# Patient Record
Sex: FEMALE | Race: WHITE | Employment: UNEMPLOYED | URBAN - METROPOLITAN AREA
[De-identification: names, ages, dates, MRNs, and addresses within clinical notes are randomized per-mention and may not be internally consistent; named-entity substitution may affect disease eponyms.]

---

## 2019-08-28 ENCOUNTER — HOSPITAL ENCOUNTER (EMERGENCY)
Facility: CLINIC | Age: 19
Discharge: HOME | End: 2019-08-28
Attending: EMERGENCY MEDICINE | Admitting: EMERGENCY MEDICINE
Payer: MEDICAID

## 2019-08-28 ENCOUNTER — APPOINTMENT (OUTPATIENT)
Dept: ULTRASOUND IMAGING | Facility: CLINIC | Age: 19
End: 2019-08-28
Payer: MEDICAID

## 2019-08-28 VITALS
HEART RATE: 75 BPM | TEMPERATURE: 98.6 F | RESPIRATION RATE: 16 BRPM | WEIGHT: 135 LBS | DIASTOLIC BLOOD PRESSURE: 65 MMHG | OXYGEN SATURATION: 100 % | SYSTOLIC BLOOD PRESSURE: 129 MMHG

## 2019-08-28 DIAGNOSIS — R58 BLEEDING: ICD-10-CM

## 2019-08-28 DIAGNOSIS — O02.1 MISSED ABORTION: ICD-10-CM

## 2019-08-28 DIAGNOSIS — R10.2 PELVIC CRAMPING: ICD-10-CM

## 2019-08-28 LAB
ABO + RH BLD: NORMAL
ABO + RH BLD: NORMAL
B-HCG SERPL-ACNC: 3810 IU/L (ref 0–5)
BLD GP AB SCN SERPL QL: NORMAL
BLOOD BANK CMNT PATIENT-IMP: NORMAL
SPECIMEN EXP DATE BLD: NORMAL

## 2019-08-28 PROCEDURE — 25000132 ZZH RX MED GY IP 250 OP 250 PS 637: Performed by: EMERGENCY MEDICINE

## 2019-08-28 PROCEDURE — 86900 BLOOD TYPING SEROLOGIC ABO: CPT | Performed by: STUDENT IN AN ORGANIZED HEALTH CARE EDUCATION/TRAINING PROGRAM

## 2019-08-28 PROCEDURE — 86850 RBC ANTIBODY SCREEN: CPT | Performed by: STUDENT IN AN ORGANIZED HEALTH CARE EDUCATION/TRAINING PROGRAM

## 2019-08-28 PROCEDURE — 99284 EMERGENCY DEPT VISIT MOD MDM: CPT | Mod: 25

## 2019-08-28 PROCEDURE — 76801 OB US < 14 WKS SINGLE FETUS: CPT

## 2019-08-28 PROCEDURE — 36415 COLL VENOUS BLD VENIPUNCTURE: CPT | Performed by: STUDENT IN AN ORGANIZED HEALTH CARE EDUCATION/TRAINING PROGRAM

## 2019-08-28 PROCEDURE — 84702 CHORIONIC GONADOTROPIN TEST: CPT | Performed by: STUDENT IN AN ORGANIZED HEALTH CARE EDUCATION/TRAINING PROGRAM

## 2019-08-28 PROCEDURE — 86901 BLOOD TYPING SEROLOGIC RH(D): CPT | Performed by: STUDENT IN AN ORGANIZED HEALTH CARE EDUCATION/TRAINING PROGRAM

## 2019-08-28 RX ORDER — OXYCODONE HYDROCHLORIDE 5 MG/1
5 TABLET ORAL ONCE
Status: COMPLETED | OUTPATIENT
Start: 2019-08-28 | End: 2019-08-28

## 2019-08-28 RX ORDER — ACETAMINOPHEN 325 MG/1
650 TABLET ORAL ONCE
Status: COMPLETED | OUTPATIENT
Start: 2019-08-28 | End: 2019-08-28

## 2019-08-28 RX ORDER — PRENATAL VIT/IRON FUM/FOLIC AC 27MG-0.8MG
1 TABLET ORAL DAILY
COMMUNITY
End: 2020-12-08

## 2019-08-28 RX ADMIN — ACETAMINOPHEN 650 MG: 325 TABLET, FILM COATED ORAL at 15:33

## 2019-08-28 RX ADMIN — OXYCODONE HYDROCHLORIDE 5 MG: 5 TABLET ORAL at 16:14

## 2019-08-28 SDOH — HEALTH STABILITY: MENTAL HEALTH: HOW OFTEN DO YOU HAVE A DRINK CONTAINING ALCOHOL?: NEVER

## 2019-08-28 ASSESSMENT — ENCOUNTER SYMPTOMS
VOMITING: 0
ABDOMINAL PAIN: 1
CONSTIPATION: 0
FEVER: 0
DIARRHEA: 0
BLOOD IN STOOL: 0
NAUSEA: 1

## 2019-08-28 NOTE — DISCHARGE INSTRUCTIONS
Please make an appointment to follow up with [facility] [phone] in the next few days even if entirely better.

## 2019-08-28 NOTE — ED TRIAGE NOTES
Pt presents to ED due possible miscarriage. Pt states she just had an US at [facility] and was told she likely miscarried and to get a 2nd opinion. Per pt she is having some abdominal cramping, but this has been normal for her during this pregnancy. Reports some intermittent bleeding for 5-6 days.

## 2019-08-28 NOTE — ED AVS SNAPSHOT
[facility]  [address]  [facility] [address]  Phone:  [phone]  Fax:  [phone]                                    Carolyn Cerda   MRN: [id number]    Department:  [facility]   Date of Visit:  8/28/2019           After Visit Summary Signature Page    I have received my discharge instructions, and my questions have been answered. I have discussed any challenges I see with this plan with the nurse or doctor.    ..........................................................................................................................................  Patient/Patient Representative Signature      ..........................................................................................................................................  Patient Representative Print Name and Relationship to Patient    ..................................................               ................................................  Date                                   Time    ..........................................................................................................................................  Reviewed by Signature/Title    ...................................................              ..............................................  Date                                               Time          22EPIC Rev 08/18

## 2019-08-29 NOTE — ED PROVIDER NOTES
Emergency Department Attending Supervision Note  2019  7:36 AM      I evaluated this patient in conjunction with Zoe Bhatia MD      Briefly, the patient presented with concerns of possible miscarriage.  Patient is  with LMP 2019.  Patient had a positive home pregnancy test which was confirmed by [facility] in July.  On  she had a pelvic ultrasound at [facility] revealing a intrauterine pregnancy with fetal cardiac activity in the 80s.  There was no signs of ectopic or heterotopic pregnancy.  I discussed the results the results with the nurse on-call for their facility and confirmed that the ultrasound was read by a physician.  Patient was doing well but then began to have problems with lower abdominal cramping and spotting.  She went to [address] [place] [address] 6 days prior which is apparently a primarily ultrasound service.  She was told that there was a gestational sac measuring 6 weeks but no signs of cardiac activity.  She then returned for repeat ultrasound and told that there was no signs of pregnancy within the uterus.  She presents today for further clarification of her underlying process.      On my exam, patient is resting comfortably in the bed.  She has minimal diffuse lower abdominal tenderness.  Uterine fundus nonpalpable.  Pelvic examination with Dr. Gar revealed a mild amount of dark blood within the vaginal vault.  Cervix was closed.    hCG remains elevated at 3800.  She is not a RhoGam candidate.  Pelvic ultrasound reveals gestational sac with no other signs of intrauterine pregnancy or ectopic pregnancy.  Clinical evaluation is most consistent with intrauterine fetal demise and missed .  Patient is safe for discharge home.  She will use Tylenol as needed for pain.  She was referred to OB/GYN services for further evaluation.  She was made aware that if she fails to complete the miscarriage on her own, that this may require D&C.  Patient comfortable with plan and  questions answered.        Diagnosis    (O02.1) Missed         MD Felix Mariscal Jeremiah R, MD  19 0766

## 2019-08-30 ENCOUNTER — HOSPITAL ENCOUNTER (EMERGENCY)
Facility: CLINIC | Age: 19
Discharge: HOME | End: 2019-08-31
Attending: EMERGENCY MEDICINE | Admitting: EMERGENCY MEDICINE
Payer: MEDICAID

## 2019-08-30 ENCOUNTER — APPOINTMENT (OUTPATIENT)
Dept: ULTRASOUND IMAGING | Facility: CLINIC | Age: 19
End: 2019-08-30
Attending: EMERGENCY MEDICINE
Payer: MEDICAID

## 2019-08-30 ENCOUNTER — NURSE TRIAGE (OUTPATIENT)
Dept: NURSING | Facility: CLINIC | Age: 19
End: 2019-08-30

## 2019-08-30 DIAGNOSIS — O03.9 MISCARRIAGE: ICD-10-CM

## 2019-08-30 LAB
ABO + RH BLD: NORMAL
ABO + RH BLD: NORMAL
ALBUMIN SERPL-MCNC: 3.7 G/DL (ref 3.4–5)
ALP SERPL-CCNC: 70 U/L (ref 40–150)
ALT SERPL W P-5'-P-CCNC: 32 U/L (ref 0–50)
ANION GAP SERPL CALCULATED.3IONS-SCNC: 9 MMOL/L (ref 3–14)
AST SERPL W P-5'-P-CCNC: 20 U/L (ref 0–45)
BASOPHILS # BLD AUTO: 0 10E9/L (ref 0–0.2)
BASOPHILS NFR BLD AUTO: 0.3 %
BILIRUB SERPL-MCNC: 0.5 MG/DL (ref 0.2–1.3)
BLD GP AB SCN SERPL QL: NORMAL
BLOOD BANK CMNT PATIENT-IMP: NORMAL
BUN SERPL-MCNC: 10 MG/DL (ref 7–30)
CALCIUM SERPL-MCNC: 8.6 MG/DL (ref 8.5–10.1)
CHLORIDE SERPL-SCNC: 108 MMOL/L (ref 94–109)
CO2 SERPL-SCNC: 22 MMOL/L (ref 20–32)
CREAT SERPL-MCNC: 0.57 MG/DL (ref 0.52–1.04)
DIFFERENTIAL METHOD BLD: NORMAL
EOSINOPHIL # BLD AUTO: 0.2 10E9/L (ref 0–0.7)
EOSINOPHIL NFR BLD AUTO: 1.9 %
ERYTHROCYTE [DISTWIDTH] IN BLOOD BY AUTOMATED COUNT: 13.9 % (ref 10–15)
GFR SERPL CREATININE-BSD FRML MDRD: >90 ML/MIN/{1.73_M2}
GLUCOSE SERPL-MCNC: 90 MG/DL (ref 70–99)
HCT VFR BLD AUTO: 37.2 % (ref 35–47)
HGB BLD-MCNC: 12.2 G/DL (ref 11.7–15.7)
IMM GRANULOCYTES # BLD: 0.1 10E9/L (ref 0–0.4)
IMM GRANULOCYTES NFR BLD: 0.5 %
LYMPHOCYTES # BLD AUTO: 2 10E9/L (ref 0.8–5.3)
LYMPHOCYTES NFR BLD AUTO: 18.6 %
MCH RBC QN AUTO: 28.2 PG (ref 26.5–33)
MCHC RBC AUTO-ENTMCNC: 32.8 G/DL (ref 31.5–36.5)
MCV RBC AUTO: 86 FL (ref 78–100)
MONOCYTES # BLD AUTO: 0.9 10E9/L (ref 0–1.3)
MONOCYTES NFR BLD AUTO: 8.5 %
NEUTROPHILS # BLD AUTO: 7.4 10E9/L (ref 1.6–8.3)
NEUTROPHILS NFR BLD AUTO: 70.2 %
NRBC # BLD AUTO: 0 10*3/UL
NRBC BLD AUTO-RTO: 0 /100
PLATELET # BLD AUTO: 181 10E9/L (ref 150–450)
POTASSIUM SERPL-SCNC: 3.3 MMOL/L (ref 3.4–5.3)
PROT SERPL-MCNC: 7.5 G/DL (ref 6.8–8.8)
RBC # BLD AUTO: 4.32 10E12/L (ref 3.8–5.2)
SODIUM SERPL-SCNC: 139 MMOL/L (ref 133–144)
SPECIMEN EXP DATE BLD: NORMAL
WBC # BLD AUTO: 10.5 10E9/L (ref 4–11)

## 2019-08-30 PROCEDURE — 86850 RBC ANTIBODY SCREEN: CPT | Performed by: EMERGENCY MEDICINE

## 2019-08-30 PROCEDURE — 25000132 ZZH RX MED GY IP 250 OP 250 PS 637: Performed by: EMERGENCY MEDICINE

## 2019-08-30 PROCEDURE — 96375 TX/PRO/DX INJ NEW DRUG ADDON: CPT | Performed by: EMERGENCY MEDICINE

## 2019-08-30 PROCEDURE — 80053 COMPREHEN METABOLIC PANEL: CPT | Performed by: EMERGENCY MEDICINE

## 2019-08-30 PROCEDURE — 25000128 H RX IP 250 OP 636: Performed by: EMERGENCY MEDICINE

## 2019-08-30 PROCEDURE — 99285 EMERGENCY DEPT VISIT HI MDM: CPT | Mod: 25 | Performed by: EMERGENCY MEDICINE

## 2019-08-30 PROCEDURE — 85025 COMPLETE CBC W/AUTO DIFF WBC: CPT | Performed by: EMERGENCY MEDICINE

## 2019-08-30 PROCEDURE — 76801 OB US < 14 WKS SINGLE FETUS: CPT

## 2019-08-30 PROCEDURE — 86900 BLOOD TYPING SEROLOGIC ABO: CPT | Performed by: EMERGENCY MEDICINE

## 2019-08-30 PROCEDURE — 96374 THER/PROPH/DIAG INJ IV PUSH: CPT | Performed by: EMERGENCY MEDICINE

## 2019-08-30 PROCEDURE — 99284 EMERGENCY DEPT VISIT MOD MDM: CPT | Mod: Z6 | Performed by: EMERGENCY MEDICINE

## 2019-08-30 PROCEDURE — 86901 BLOOD TYPING SEROLOGIC RH(D): CPT | Performed by: EMERGENCY MEDICINE

## 2019-08-30 RX ORDER — MORPHINE SULFATE 4 MG/ML
4 INJECTION, SOLUTION INTRAMUSCULAR; INTRAVENOUS
Status: COMPLETED | OUTPATIENT
Start: 2019-08-30 | End: 2019-08-30

## 2019-08-30 RX ORDER — HYDROCODONE BITARTRATE AND ACETAMINOPHEN 5; 325 MG/1; MG/1
1 TABLET ORAL ONCE
Status: COMPLETED | OUTPATIENT
Start: 2019-08-30 | End: 2019-08-30

## 2019-08-30 RX ORDER — HYDROCODONE BITARTRATE AND ACETAMINOPHEN 5; 325 MG/1; MG/1
1 TABLET ORAL EVERY 6 HOURS PRN
Qty: 6 TABLET | Refills: 0 | Status: SHIPPED | OUTPATIENT
Start: 2019-08-30 | End: 2019-11-27

## 2019-08-30 RX ORDER — HYDROMORPHONE HYDROCHLORIDE 1 MG/ML
0.5 INJECTION, SOLUTION INTRAMUSCULAR; INTRAVENOUS; SUBCUTANEOUS
Status: COMPLETED | OUTPATIENT
Start: 2019-08-30 | End: 2019-08-30

## 2019-08-30 RX ADMIN — HYDROMORPHONE HYDROCHLORIDE 0.5 MG: 1 INJECTION, SOLUTION INTRAMUSCULAR; INTRAVENOUS; SUBCUTANEOUS at 22:16

## 2019-08-30 RX ADMIN — MORPHINE SULFATE 4 MG: 4 INJECTION INTRAVENOUS at 20:02

## 2019-08-30 RX ADMIN — HYDROCODONE BITARTRATE AND ACETAMINOPHEN 1 TABLET: 5; 325 TABLET ORAL at 19:38

## 2019-08-30 ASSESSMENT — ENCOUNTER SYMPTOMS
DIFFICULTY URINATING: 0
ARTHRALGIAS: 0
ABDOMINAL PAIN: 0
FEVER: 0
CONFUSION: 0
SHORTNESS OF BREATH: 0
HEADACHES: 0
EYE REDNESS: 0
NECK STIFFNESS: 0
COLOR CHANGE: 0

## 2019-08-30 ASSESSMENT — MIFFLIN-ST. JEOR: SCORE: 1346.49

## 2019-08-30 NOTE — ED AVS SNAPSHOT
[facility], [place], Emergency Department  [address] [place] [address]  Phone:  [phone]                                    Carolyn Cerda   MRN: [id number]    Department:  [facility], Emergency Department   Date of Visit:  8/30/2019           After Visit Summary Signature Page    I have received my discharge instructions, and my questions have been answered. I have discussed any challenges I see with this plan with the nurse or doctor.    ..........................................................................................................................................  Patient/Patient Representative Signature      ..........................................................................................................................................  Patient Representative Print Name and Relationship to Patient    ..................................................               ................................................  Date                                   Time    ..........................................................................................................................................  Reviewed by Signature/Title    ...................................................              ..............................................  Date                                               Time          22EPIC Rev 08/18

## 2019-08-30 NOTE — LETTER
August 30, 2019      To Whom It May Concern:      Carolyn Cerda was seen in our Emergency Department today, 08/30/19.  I expect her condition to improve over the next 5 days.  She may return to work when improved.    Sincerely,        Jomar Broussard, DO

## 2019-08-30 NOTE — TELEPHONE ENCOUNTER
Patient reports she was in the emergency department on 8/28 for miscarriage. She was told to follow up with OB provider. Patient reports constant abdominal pain for last 3 hours. Rates pain 9-10/10. She is soaking about 1 pad per hour since this morning. Feels dizzy, but doesn't feel like she is going to faint.   Triage guidelines recommend ED. Protocol and care advice reviewed.  Caller states understanding of the recommended disposition. FNA gave address of [facility] ED.   Advised to call back if further questions or concerns.    Madelin York RN/[facility]    Reason for Disposition    SEVERE abdominal pain    Additional Information    Negative: Shock suspected (e.g., cold/pale/clammy skin, too weak to stand, low BP, rapid pulse)    Negative: Difficult to awaken or acting confused (e.g., disoriented, slurred speech)    Negative: Passed out (i.e., lost consciousness, collapsed and was not responding)    Negative: Sounds like a life-threatening emergency to the triager    Protocols used: VAGINAL BLEEDING - [id number]

## 2019-08-30 NOTE — ED TRIAGE NOTES
"Triage Assessment & Note:    /50   Pulse 101   Temp 98.3  F (36.8  C) (Oral)   Resp 18   Ht 1.6 m (5' 3\")   Wt 61.2 kg (135 lb)   LMP 05/20/2019   SpO2 100%   BMI 23.91 kg/m      Patient presents with: C/o abdominal pain post miscarriage last several days. Today she presents with increasing abdominal pain and vaginal discharge. PT reports going though 4 pads today.     Home Treatments/Remedies: Midial    Febrile / Afebrile? Afebrile     Duration of C/o:  1 week     Abdi Muller RN  August 30, 2019      "

## 2019-08-31 VITALS
SYSTOLIC BLOOD PRESSURE: 108 MMHG | TEMPERATURE: 98.3 F | WEIGHT: 135 LBS | HEIGHT: 63 IN | DIASTOLIC BLOOD PRESSURE: 60 MMHG | RESPIRATION RATE: 18 BRPM | HEART RATE: 74 BPM | OXYGEN SATURATION: 100 % | BODY MASS INDEX: 23.92 KG/M2

## 2019-08-31 NOTE — DISCHARGE INSTRUCTIONS
Please make an appointment to follow up with OB/Gyn--[facility] (phone: [phone]) as soon as possible.    Return to the emergency department if symptoms get worse, there are any new symptoms or any cause for concern.

## 2019-08-31 NOTE — ED PROVIDER NOTES
History     Chief Complaint   Patient presents with     Miscarriage     HPI  Carolyn Cerda is a 19 year old female who is  who presents to the Emergency Department today for evaluation of vaginal bleeding and pelvic pain with concern of miscarriage.  Per chart review, on  the patient had a pelvic ultrasound performed which estimated a pregnancy to be about 6 weeks with cardiac activity present.  8 days ago, the patient was then seen due to abdominal cramping and spotting.  At that time the [facility] in [place] where she had an abdominal ultrasound performed and no heartbeat was heard, but baby was seen on ultrasound.  The patient was then seen in the ED on  after being seen in clinic and was told that she was having a miscarriage.    Today, the patient reports that she has continued to have pelvic pain and spotting since her ED visit.  However, today the patient reports that her pelvic pain and vaginal bleeding became significantly worse.  She reports that she has been having sharp pelvic pain along with heavy vaginal bleeding.  She reports that her pain and bleeding increase this morning.  She reports that she has gone through at least 4 pads today.  The patient reports that her pelvic pain was most significant in her left pelvis but has spread across her entire pelvis.  The patient denies any history of pregnancy.  No other complaints noted.    I have reviewed the Medications, Allergies, Past Medical and Surgical History, and Social History in the [employer] system.    Past Medical History:   Diagnosis Date     Anemia      No past surgical history on file.    No family history on file.    Social History     Tobacco Use     Smoking status: Never Smoker     Smokeless tobacco: Never Used   Substance Use Topics     Alcohol use: Never     Frequency: Never     No current facility-administered medications for this encounter.      Current Outpatient Medications   Medication      "Prenatal Vit-Fe Fumarate-FA (PRENATAL MULTIVITAMIN W/IRON) 27-0.8 MG tablet      No Known Allergies     Review of Systems   Constitutional: Negative for fever.   HENT: Negative for congestion.    Eyes: Negative for redness.   Respiratory: Negative for shortness of breath.    Cardiovascular: Negative for chest pain.   Gastrointestinal: Negative for abdominal pain.   Genitourinary: Positive for pelvic pain and vaginal bleeding. Negative for difficulty urinating.   Musculoskeletal: Negative for arthralgias and neck stiffness.   Skin: Negative for color change.   Neurological: Negative for headaches.   Psychiatric/Behavioral: Negative for confusion.       Physical Exam   BP: 127/50  Pulse: 101  Temp: 98.3  F (36.8  C)  Resp: 18  Height: 160 cm (5' 3\")  Weight: 61.2 kg (135 lb)  SpO2: 100 %    Physical Exam   Constitutional: She is oriented to person, place, and time. She appears well-developed and well-nourished. She appears distressed.   HENT:   Head: Normocephalic and atraumatic.   Mouth/Throat: No oropharyngeal exudate.   Eyes: Pupils are equal, round, and reactive to light. Right eye exhibits no discharge. Left eye exhibits no discharge. No scleral icterus.   Neck: Normal range of motion. Neck supple.   Cardiovascular: Regular rhythm, normal heart sounds and intact distal pulses. Tachycardia present. Exam reveals no gallop and no friction rub.   No murmur heard.  Pulmonary/Chest: Effort normal and breath sounds normal. No respiratory distress. She has no wheezes. She exhibits no tenderness.   Abdominal: Soft. Bowel sounds are normal. She exhibits no distension. There is tenderness in the right lower quadrant, suprapubic area and left lower quadrant.   Genitourinary:   Genitourinary Comments: Minimal vaginal bleeding.    Musculoskeletal: Normal range of motion. She exhibits no edema, tenderness or deformity.   Neurological: She is alert and oriented to person, place, and time. No cranial nerve deficit.   Skin: Skin " is warm and dry. No rash noted. She is not diaphoretic. No erythema. No pallor.   Psychiatric: She has a normal mood and affect.   Nursing note and vitals reviewed.      ED Course   7:36 PM  The patient was seen and examined by Dr. Broussard in Room 04.       Procedures             Critical Care time:  none             Labs Ordered and Resulted from Time of ED Arrival Up to the Time of Departure from the ED   COMPREHENSIVE METABOLIC PANEL - Abnormal; Notable for the following components:       Result Value    Potassium 3.3 (*)     All other components within normal limits   CBC WITH PLATELETS DIFFERENTIAL   ABO/RH TYPE AND SCREEN            Assessments & Plan (with Medical Decision Making)   This is a 19-year-old female who was recently diagnosed with fetal demise on 8/28/2019 at approximately 6 weeks gestation who presents with worsening pelvic pain and bleeding.  She been having a small amount until today when her bleeding became heavy and her pelvic pain became severe.  On exam patient is tearful, in distress.  Lower abdomen is tender.  Lab work shows no acute abnormalities.  Ultrasound shows thickened endometrial stripe, likely due to fetal demise as well as small volume of fluid and echogenic material in the lower uterine segment. Findings are suggestive of blood clots. Doppler images demonstrate no vascular tissue within the endometrial canal. Patient was given pain control in the emergency department.  I discussed the case with OB who also reviewed images.  They recommend discharge home with OB follow-up.  Patient was given pain control emergency department with an improvement in her symptoms.  Patient also had a significant decrease in her bleeding while in the emergency department.  Will provide a short course of pain medication after a discussion of risks associated with this medication. Will discharge home with return precautions. Discussed reasons to return to the emergency department.  Patient understands  and agrees with this plan.    I have reviewed the nursing notes.    I have reviewed the findings, diagnosis, plan and need for follow up with the patient.  New Prescriptions    No medications on file     Final diagnoses:   None   I, Ramos Ortega, am serving as a trained medical scribe to document services personally performed by Jomar Broussard DO, based on the provider's statements to me.   Jomar SMALL DO, was physically present and have reviewed and verified the accuracy of this note documented by Ramos Ortega.     8/30/2019   [facility], [place], EMERGENCY DEPARTMENT     Jomar Broussard DO  08/31/19 1921

## 2019-11-27 ENCOUNTER — ANCILLARY PROCEDURE (OUTPATIENT)
Dept: ULTRASOUND IMAGING | Facility: CLINIC | Age: 19
End: 2019-11-27
Payer: COMMERCIAL

## 2019-11-27 ENCOUNTER — PRENATAL OFFICE VISIT (OUTPATIENT)
Dept: NURSING | Facility: CLINIC | Age: 19
End: 2019-11-27
Payer: COMMERCIAL

## 2019-11-27 DIAGNOSIS — Z34.90 SUPERVISION OF NORMAL PREGNANCY: Primary | ICD-10-CM

## 2019-11-27 DIAGNOSIS — Z34.90 SUPERVISION OF NORMAL PREGNANCY: ICD-10-CM

## 2019-11-27 PROCEDURE — 76801 OB US < 14 WKS SINGLE FETUS: CPT | Performed by: FAMILY MEDICINE

## 2019-11-27 PROCEDURE — 99207 ZZC NO CHARGE NURSE ONLY: CPT

## 2019-11-27 PROCEDURE — 76817 TRANSVAGINAL US OBSTETRIC: CPT | Performed by: FAMILY MEDICINE

## 2019-11-27 NOTE — LETTER
[facility]  [address], [address]  [place] [address]  [phone]        November 27, 2019    Re: Carolyn Cerda  [address]  [place] [address]            To Whom It May Concern        Carolyn is a patient that was seen in our clinic and had an ultrasound confirming a pregnancy.  If you have any further questions or problems, please contact our office.    Sincerely,      Natalie Roa RN

## 2019-11-27 NOTE — PROGRESS NOTES
Pt lives in [place] and does not wish to deliver in [place].  Pt had U/s showing pregnancy at 5w3d.    Needs letter just stating she is pregnant, provided that.  Gave resources for scheduling with a doctor that delivers near her home.    Natalie MCKINLEY R.N.  [facility]

## 2019-11-28 ENCOUNTER — TELEPHONE (OUTPATIENT)
Dept: OBGYN | Facility: CLINIC | Age: 19
End: 2019-11-28

## 2019-11-28 DIAGNOSIS — Z34.90 SUPERVISION OF NORMAL PREGNANCY: Primary | ICD-10-CM

## 2019-11-28 NOTE — TELEPHONE ENCOUNTER
Please review the ultrasound results with the patient showing a viable intrauterine pregnancy.  The heart rate at this time is low and I believe this because of the early gestational age.  I like her to repeat an ultrasound in 2 weeks and if this is normal schedule a first OB exam.  The patient should call for any pain discomfort bleeding or other concerning symptoms in the interval  Thank you

## 2019-12-04 ENCOUNTER — HOSPITAL ENCOUNTER (OUTPATIENT)
Dept: ULTRASOUND IMAGING | Facility: CLINIC | Age: 19
Discharge: HOME | End: 2019-12-04
Attending: OBSTETRICS & GYNECOLOGY | Admitting: OBSTETRICS & GYNECOLOGY
Payer: COMMERCIAL

## 2019-12-04 DIAGNOSIS — Z34.90 SUPERVISION OF NORMAL PREGNANCY: ICD-10-CM

## 2019-12-04 PROCEDURE — 76801 OB US < 14 WKS SINGLE FETUS: CPT

## 2019-12-10 ENCOUNTER — TELEPHONE (OUTPATIENT)
Dept: OBGYN | Facility: CLINIC | Age: 19
End: 2019-12-10

## 2019-12-10 NOTE — TELEPHONE ENCOUNTER
Please inform the patient of the normal ultrasound results demonstrating a viable intrauterine pregnancy with dating as noted  Thank you

## 2019-12-11 DIAGNOSIS — Z34.90 SUPERVISION OF NORMAL PREGNANCY: ICD-10-CM

## 2019-12-12 ENCOUNTER — TELEPHONE (OUTPATIENT)
Dept: OBGYN | Facility: CLINIC | Age: 19
End: 2019-12-12

## 2019-12-12 NOTE — TELEPHONE ENCOUNTER
Please advise the patient of the ultrasound results showing a viable intrauterine gestation with EDC is noted    Thank you

## 2019-12-16 NOTE — PATIENT INSTRUCTIONS
Thank you for coming to see the Midwives at the   [facility]!      We will notify you about your labs that were drawn today once we get the results back.  If you have [facility] your lab results will be posted there.      Someone from the clinic will call you personally or send you a [employer] message with your results.      If you need any refills of medications please call your pharmacy and they will contact us.      If you have a medical emergency please call 911.      If you have any concerns about today's visit, wish to schedule another appointment, or have an urgent medical concern please call our office at [phone]. You can also make appointments through [employer].      After hours you may also call the clinic number above to be connected with [facility] after hours triage nurse.  The nurse can page the midwife on call if needed. There is always a midwife on call 24 hours a day.    Prenatal Care Recommendations:    Before 14 weeks: Dating ultrasound, genetic testing       This ultrasound helps us determine your dates accurately. Innatal (genetic screening test) can be drawn anytime after 10 weeks of gestation.    16 weeks: Optional genetic testing single AFP       This testing helps understand your baby's risk for some genetic abnormalities.    18-22 weeks:  Screening anatomy ultrasound       This testing will look for early growth abnormalities, placenta location, and may tell the baby's gender if you wish to find out.    24-27 weeks: One hour diabetes test (GCT) and complete blood count       This test helps identify diabetes of pregnancy or gestational diabetes.  We also look   at the iron in your blood and how well your blood clots.    28 - 36 weeks: Tetanus shot (Tdap)       This shot helps protect you and your baby from whooping cough.    36 weeks and later: Group B Strep test (GBS)       This test helps predict if you need antibiotics in labor to prevent infection for your baby.    Anytime  September to April:  Flu shot       This shot helps protect you and your family from the flu.  This is especially important during pregnancy.        The typical schedule after your first visit today you can expect:     Visit 2 - 12-16 weeks  Visit 3 - 20 weeks  Visit 4 - 24 weeks  Visit 5 - 28 weeks  Visit 6 - 30 weeks  Visit 7 - 32 weeks  Visit 8 - 34 weeks  Visit 9 - 36 weeks  Weekly after 36 weeks until delivery.        Any time during or after your pregnancy you may experience increased depression and/or mood changes.    We are here to support you. Please contact us if you are:    Feeling anxious    Overwhelmed or sad     Trouble sleeping    Crying uncontrollably    Trouble caring for yourself or baby.    Any thoughts of hurting yourself, your baby, or anyone else    If anything comes up between your visits or you have concerns please don't hesitate to contact us.    Secure access to your medical record:  Use [employer] (secure email communication and access to your chart) to send your primary care provider a message or make an appointment. Ask someone on your Team how to sign up for [employer]. To log on to [employer] or for more information in [employer] please visit the website at [email or web address].       Certified Nurse Midwife (CNM) Team    SALLY De Los Santos, SALLY STORY, SALLY, [facility]  Mandy Deutsch DNP, PORSHA, SALLY Whitt DNP, PORSHA, SALLY      Again, thank you for choosing the midwives at [facility].  We are excited to be a part of your pregnancy. Please let us know how we can best partner with you to improve your and your family's health.    Over-the-counter (OTC) medications during pregnancy    Make sure to follow package directions for dosing and information unless otherwise noted on the list.    Morning sickness/nausea:      Unisom (doxylamine) 12.5 mg (1/2 tab) and Vitamin B6 25-50 mg three times daily. Unisom may cause some drowsiness as it  is typically used for sleep. The combination of these medications can be very effective but they can also be taken separately    Dramamine (Dimenhydrinate) 25-50 mg every 4-6 hours as needed    Benadryl (diphenhydramine) 25-50 mg every 4-6 hours     Ginger tablets 1000 mg per day in divided doses    Constipation:      Colace (Docusate sodium)    Metamucil    Citrucel    Milk of magnesia    Fibercon    Miralax (if all other methods have failed)    Diarrhea:    Imodium (loperamide)    Heartburn:      Zantac (ranitidine)    Pepcid (famotidine)    Prilosec (omeprazole)    Antacids-Tums, Maalox (liquid or tablets), Rolaids  Pepto Bismol and Lanie Auburn are NOT RECOMMENDED for use during pregnancy because they contain aspirin    Hemorrhoids:      Tucks pads/ointment    Anusol/Anusol-HC    Preparation H    Gas Pain  Simethicone (Gas-X, Mylanta Gas, Mylicon)      Cough, cold, and congestion:      Robitussin (dextromethorphan) and Robitussin DM (dextromethorphan and guaifenesin)    Cough drops/zinc lozenges    Mucinex    Sudafed (after 16 weeks gestation)    Vicks Vapo rub  Cough medicine with alcohol like Nyquil is not recommended during pregnancy    Headache:      Acetaminophen (Tylenol) 650 mg every 4-6 hours or 1000 mg every 6 hours, do not exceed 4000 mg in 24 hours   Ibuprofen (Advil/Motrin) and Naproxen (Aleve) are not recommended during the 1st or 3rd trimester of pregnancy    Allergy:      Benadryl (diphenhydramine)    Zyrtec (cetirizine)    Claritin (loratadine)    Rash/Itching:      Benadryl lotion    Cortaid cream (Hydrocortisone cream)    Benadryl (diphenhydramine)    Vaginal yeast infection:      Monistat 3 or 7 day    Gyne-Lotrimin    Acne:      Benzoyl Peroxide    Salicylic acid     If you have questions or concerns about any medications that are available OTC or you are unsure if something is safe call:    [facility]  [phone]      Iron Rich Foods  Iron is an important mineral for  good health. Without enough iron you are more prone to getting sick and feeling tired.  Babies/children need iron for healthy brain development.    Recommended amount of Iron for Women  Ages 14-18  15 mg  Ages 19-49  18 mg   Over 50  8 mg  Pregnancy  27 mg  Breastfeeding  9 mg  Vegetarians   33 mg    There is extra iron in multivitamins and prenatal vitamins. Sometimes women can have a lower hemoglobin (part of your blood that carries oxygen) after menses and when pregnant. Adding some of these iron rich foods to your diet can help you feel better, bring your iron levels up without supplementing with iron pills or liquids. If you are already supplementing with iron these food will only help!    Iron Rich Foods:    Meat (2.5 oz servings range anywhere from 0.6-21 mg of Iron)  Clams      Liver (chicken/pork)     Oysters    Mussels       Beef liver    Beef       Shrimp     Sardines     Tuna/herring/mackerel   Chicken  Pork     Turkey/Lamb  Mead     Flounder/sole  Vegtables  Dark green leafy vegetables like kale/mixed greens /swiss chard  Broccoli      Asparagus  Green peas      Beets  Potato (with skin)  Beans   Chickpeas      Recinos Beans  Soy beans      Kidney beans   Lentils       Refried beans   Grains  Whole wheat bread     Bagels  Pasta (enriched)     Quinoa  Cereal       Shredded wheat  Cream of wheat  (12 mg)    Oatmeal  Pearled barley      Wheat germ (toasted)  Other  pumpkin seeds     Tofu  Raisins       Peanut butter  Tahini        Eggs  Sour Cherries      Prune juice

## 2019-12-16 NOTE — PROGRESS NOTES
Carolyn Cerda is a 19 year old single ,  who is not a previous CNM patient. She presents for a new OB Visit. This was a planned pregnancy. She is employed outside the home.  Job profession is  at [employer].   FOB is Neftali who is in good health.  FOB IS actively involved in relationship and this pregnancy.    Patient and FOB currently do live together.    She has not had bleeding since her LMP.    She denies abdominal pain since her LMP.  She has had nausea.  has not had vomiting.  Any personal or family history of blood clots? No  History of sickle cell anemia or trait? No         No LMP recorded (lmp unknown). Patient is pregnant..  Estimated Date of Delivery: 2020 Ultrasound is not consistent with LMP.     MENSTRUAL HISTORY    Menses irregular  Last PAP:  Never done  History of abnormal Pap?  No  Next PAP due today    Health maintenance updated:  yes        Current medications are:    Current Outpatient Medications:      Prenatal Vit-Fe Fumarate-FA (PRENATAL MULTIVITAMIN W/IRON) 27-0.8 MG tablet, Take 1 tablet by mouth daily, Disp: , Rfl:      INFECTION HISTORY  HIV: No  Hepatitis B: No  Hepatitis C: No  Tuberculosis: No  Last PPD NA  Genital Herpes self: no  Herpes partner:  no  Chlamydia:  no  Gonorrhea:  no  HPV: No  BV:  No  Syphilis:  No  Chicken Pox:  unsure      OB HISTORY  OB History    Para Term  AB Living   2 0 0 0 1 0   SAB TAB Ectopic Multiple Live Births   1 0 0 0 0      # Outcome Date GA Lbr Skinny/2nd Weight Sex Delivery Anes PTL Lv   2 Current            1 2019               PERSONAL HISTORY  Exercise Habits:  none   Employment: Full time.  Her job involves moderate activity with little potential for toxic exposure.    Travel plans:  are intra [place] air travel.   Diet: eats at irregular times and takes daily vitamins  Abuse concerns? No  Hgb A1c screen:  BMI > 30: No, 1st degree family DM: No, History of GDM: No, PCOS: No, High risk ethnicity:  Yes    Social History     Socioeconomic History     Marital status: Single     Spouse name: Not on file     Number of children: Not on file     Years of education: Not on file     Highest education level: Not on file   Occupational History     Occupation: packaging   Social Needs     Financial resource strain: Not on file     Food insecurity:     Worry: Not on file     Inability: Not on file     Transportation needs:     Medical: Not on file     Non-medical: Not on file   Tobacco Use     Smoking status: Never Smoker     Smokeless tobacco: Never Used   Substance and Sexual Activity     Alcohol use: Never     Frequency: Never     Drug use: Not Currently     Sexual activity: Yes     Partners: Male   Lifestyle     Physical activity:     Days per week: Not on file     Minutes per session: Not on file     Stress: Not on file   Relationships     Social connections:     Talks on phone: Not on file     Gets together: Not on file     Attends Sabianism service: Not on file     Active member of club or organization: Not on file     Attends meetings of clubs or organizations: Not on file     Relationship status: Not on file     Intimate partner violence:     Fear of current or ex partner: Not on file     Emotionally abused: Not on file     Physically abused: Not on file     Forced sexual activity: Not on file   Other Topics Concern     Not on file   Social History Narrative     Not on file         She  reports that she has never smoked. She has never used smokeless tobacco.    STD testing offered?  Accepted  Last PHQ-9 score on record = No flowsheet data found.  Last GAD7 score on record = No flowsheet data found.  Alcohol Score = 0  Referral/Meds needed? No    PAST MEDICAL/SURGICAL HISTORY  Past Medical History:   Diagnosis Date     No pertinent past medical history      Past Surgical History:   Procedure Laterality Date     NO HISTORY OF SURGERY         FAMILY HISTORY  Family History   Problem Relation Age of Onset     Lupus  Mother          ROS:  12 point review of systems negative other than symptoms noted below or in the HPI.  Gastrointestinal: Heartburn and Nausea  Genitourinary: No Periods  12 point review of systems negative other than symptoms noted below.      PHYSICAL EXAM  Vitals: /56   Wt 63.5 kg (140 lb)   LMP  (LMP Unknown)   Breastfeeding Unknown   BMI 24.80 kg/m    BMI= Body mass index is 24.8 kg/m .     GENERAL:  19 year old pleasant pregnant female, alert, cooperative and well groomed.  NECK:  Thyroid without enlargement and nodules.  Lymph nodes not palpable.   LUNGS:  Clear to auscultation.  BREAST:  Deferred  HEART:  RRR without murmur.  ABDOMEN: Soft without masses or tenderness.  No scars noted..  GENITALIA: BUS without tenderness or inflammation.  Perineum without lesions.    VAGINA:  Pink, normal rugae and discharge.  CERVIX:  smooth, without discharge, and firm/ closed.   UTERUS:  Midposition, nontender 9 weeks in size.  ADNEXA: Without masses or tenderness  RECTAL:  Normal appearance.  Digital exam deferred.  LOWER EXTREMITIES: No edema. No significant varicosities.    ASSESSMENT/PLAN:    IUP at Unknown    ICD-10-CM    1. Routine screening for STI (sexually transmitted infection) Z11.3 NEISSERIA GONORRHOEA PCR     CHLAMYDIA TRACHOMATIS PCR   2. Screening for malignant neoplasm of cervix Z12.4 Pap imaged thin layer screen only - recommended age 21 - 24 years   3. Encounter for supervision of other normal pregnancy in first trimester Z34.81    4. Genetic screening Z13.79 Non Invasive Prenatal Test Cell Free DNA       Genetic Testing reviewed and discussed, patient desires Innatal test. Handout provided  Consent signed    COUNSELING    Instructed on use of triage nurse line and contacting the on call CNM after hours in an emergency.     Symptoms of N&V and fatigue usually start to resolve around 12-16 weeks     Reviewed CNM philosophy, call schedule for labor and delivery, and FSH for delivery    1st OB  handout given outlining appointment spacing and CNM information.  Will likely go to [place] location for appointments.    Reviewed exercise and nutrition    Recommend to gain 25-35 pounds with her pregnancy.    Discussed OTC medications. OB med list given    Encouraged patient to take PNV's/DHA    Travel precautions discussed, no air travel after 36 weeks and Zika Virus discussed    Counseled on danger signs, signs and symptoms SAB, encouraged to call with any abd pain, vaginal bleeding or any other concerns    Will call patient with lab results when available    Letter for work given; no lifting >30 lbs, can work 8 hr shifts    F/U to be addressed next visit:  Genetic screening    Will return to the clinic in 4 weeks for her next routine prenatal check.  Will call to be seen sooner if problems arise.    Trudy STORY, YOLAM

## 2019-12-18 ENCOUNTER — PRENATAL OFFICE VISIT (OUTPATIENT)
Dept: NURSING | Facility: CLINIC | Age: 19
End: 2019-12-18
Payer: COMMERCIAL

## 2019-12-18 ENCOUNTER — PRENATAL OFFICE VISIT (OUTPATIENT)
Dept: OBGYN | Facility: CLINIC | Age: 19
End: 2019-12-18
Payer: COMMERCIAL

## 2019-12-18 VITALS
SYSTOLIC BLOOD PRESSURE: 112 MMHG | HEIGHT: 63 IN | WEIGHT: 140.3 LBS | DIASTOLIC BLOOD PRESSURE: 56 MMHG | BODY MASS INDEX: 24.86 KG/M2

## 2019-12-18 VITALS — SYSTOLIC BLOOD PRESSURE: 112 MMHG | BODY MASS INDEX: 24.8 KG/M2 | WEIGHT: 140 LBS | DIASTOLIC BLOOD PRESSURE: 56 MMHG

## 2019-12-18 DIAGNOSIS — Z13.79 GENETIC SCREENING: ICD-10-CM

## 2019-12-18 DIAGNOSIS — Z12.4 SCREENING FOR MALIGNANT NEOPLASM OF CERVIX: ICD-10-CM

## 2019-12-18 DIAGNOSIS — Z34.81 ENCOUNTER FOR SUPERVISION OF OTHER NORMAL PREGNANCY IN FIRST TRIMESTER: ICD-10-CM

## 2019-12-18 DIAGNOSIS — Z11.3 ROUTINE SCREENING FOR STI (SEXUALLY TRANSMITTED INFECTION): Primary | ICD-10-CM

## 2019-12-18 DIAGNOSIS — Z34.80 PRENATAL CARE, SUBSEQUENT PREGNANCY, UNSPECIFIED TRIMESTER: Primary | ICD-10-CM

## 2019-12-18 LAB
ABO + RH BLD: NORMAL
ABO + RH BLD: NORMAL
BLD GP AB SCN SERPL QL: NORMAL
BLOOD BANK CMNT PATIENT-IMP: NORMAL
ERYTHROCYTE [DISTWIDTH] IN BLOOD BY AUTOMATED COUNT: 14.3 % (ref 10–15)
HCT VFR BLD AUTO: 36.8 % (ref 35–47)
HGB BLD-MCNC: 12.3 G/DL (ref 11.7–15.7)
MCH RBC QN AUTO: 28.1 PG (ref 26.5–33)
MCHC RBC AUTO-ENTMCNC: 33.4 G/DL (ref 31.5–36.5)
MCV RBC AUTO: 84 FL (ref 78–100)
PLATELET # BLD AUTO: 185 10E9/L (ref 150–450)
RBC # BLD AUTO: 4.38 10E12/L (ref 3.8–5.2)
SPECIMEN EXP DATE BLD: NORMAL
WBC # BLD AUTO: 8.2 10E9/L (ref 4–11)

## 2019-12-18 PROCEDURE — 86762 RUBELLA ANTIBODY: CPT | Performed by: NURSE PRACTITIONER

## 2019-12-18 PROCEDURE — 87086 URINE CULTURE/COLONY COUNT: CPT | Performed by: NURSE PRACTITIONER

## 2019-12-18 PROCEDURE — 87591 N.GONORRHOEAE DNA AMP PROB: CPT | Performed by: NURSE PRACTITIONER

## 2019-12-18 PROCEDURE — 86900 BLOOD TYPING SEROLOGIC ABO: CPT | Performed by: NURSE PRACTITIONER

## 2019-12-18 PROCEDURE — 86787 VARICELLA-ZOSTER ANTIBODY: CPT | Performed by: NURSE PRACTITIONER

## 2019-12-18 PROCEDURE — 36415 COLL VENOUS BLD VENIPUNCTURE: CPT | Performed by: NURSE PRACTITIONER

## 2019-12-18 PROCEDURE — 87340 HEPATITIS B SURFACE AG IA: CPT | Performed by: NURSE PRACTITIONER

## 2019-12-18 PROCEDURE — 87491 CHLMYD TRACH DNA AMP PROBE: CPT | Performed by: NURSE PRACTITIONER

## 2019-12-18 PROCEDURE — 86901 BLOOD TYPING SEROLOGIC RH(D): CPT | Performed by: NURSE PRACTITIONER

## 2019-12-18 PROCEDURE — 99207 ZZC NO CHARGE NURSE ONLY: CPT

## 2019-12-18 PROCEDURE — 85027 COMPLETE CBC AUTOMATED: CPT | Performed by: NURSE PRACTITIONER

## 2019-12-18 PROCEDURE — 87389 HIV-1 AG W/HIV-1&-2 AB AG IA: CPT | Performed by: NURSE PRACTITIONER

## 2019-12-18 PROCEDURE — 86780 TREPONEMA PALLIDUM: CPT | Performed by: NURSE PRACTITIONER

## 2019-12-18 PROCEDURE — 86850 RBC ANTIBODY SCREEN: CPT | Performed by: NURSE PRACTITIONER

## 2019-12-18 PROCEDURE — 99207 ZZC FIRST OB VISIT: CPT | Performed by: NURSE PRACTITIONER

## 2019-12-18 PROCEDURE — G0145 SCR C/V CYTO,THINLAYER,RESCR: HCPCS | Performed by: NURSE PRACTITIONER

## 2019-12-18 ASSESSMENT — MIFFLIN-ST. JEOR: SCORE: 1370.53

## 2019-12-18 NOTE — LETTER
December 18, 2019      Carolyn Cerda  [address]  [place] [address]        To Whom It May Concern:    Carolyn Cerda was seen in our clinic. She may return to work with the following: limited to 8 hour workday and limited to light duty - lifting no greater than 30 pounds due to pregnancy.      Sincerely,        PORSHA Mccarthy CNM

## 2019-12-19 LAB
C TRACH DNA SPEC QL NAA+PROBE: NEGATIVE
HBV SURFACE AG SERPL QL IA: NONREACTIVE
HIV 1+2 AB+HIV1 P24 AG SERPL QL IA: NONREACTIVE
N GONORRHOEA DNA SPEC QL NAA+PROBE: NEGATIVE
RUBV IGG SERPL IA-ACNC: 12 IU/ML
SPECIMEN SOURCE: NORMAL
SPECIMEN SOURCE: NORMAL
T PALLIDUM AB SER QL: NONREACTIVE
VZV IGG SER QL IA: 2.5 AI (ref 0–0.8)

## 2019-12-20 LAB
BACTERIA SPEC CULT: NORMAL
BACTERIA SPEC CULT: NORMAL
COPATH REPORT: NORMAL
PAP: NORMAL
SPECIMEN SOURCE: NORMAL

## 2019-12-30 ENCOUNTER — TELEPHONE (OUTPATIENT)
Dept: OBGYN | Facility: CLINIC | Age: 19
End: 2019-12-30

## 2019-12-30 NOTE — TELEPHONE ENCOUNTER
Pt called wanting to speak to Midwife or nurse. Experiencing cramping (increased in frequency) over the last few days, with discharge. Pt concerned as she has had miscarriage previously.

## 2019-12-31 DIAGNOSIS — N83.209 OVARIAN CYST AFFECTING PREGNANCY IN FIRST TRIMESTER, ANTEPARTUM: Primary | ICD-10-CM

## 2019-12-31 DIAGNOSIS — O34.81 OVARIAN CYST AFFECTING PREGNANCY IN FIRST TRIMESTER, ANTEPARTUM: Primary | ICD-10-CM

## 2019-12-31 NOTE — TELEPHONE ENCOUNTER
Hx of Asthma and states she has no inhaler at this time.  Wondering if she could have rx prior to her next appointment.      Also, she has been having extra discharge and low back ache.  Biggest complaint is that for about 5 days she has been having right sided sharp pains 5 of 10 that start at the crease of her leg (grion) and ran down her leg, sometimes making the right leg numb.  She is missing working because of it.    Reviewed use of tylenol, ice, heat for comfort.  States that she has been constipated as well.  Instructed increase fluid intake, added fiber to diet the the importance of staying regular.    Routing to provider to advise further recommendations.  Josefina Rincon RN on 12/31/2019 at 10:50 AM

## 2019-12-31 NOTE — TELEPHONE ENCOUNTER
Is she having asthma signs and symptoms or any URI signs and symptoms?  If so, she would be best seeing a primary care provider or urgent care to be evaluated.      Also, we do not have on record any inhalers for her.  Can you ask her if she was on an inhaler in the past and what it was?      I have ordered an US for her.  If abd cramping/discharge is increasing, I would like for her to have an US and visit with one of us.  She has an appt scheduled on 1/8/20.  She can either come in sooner, or keep the appt on 1/8/20.  Either way, I would like for her to have another US.  Can you help facilitate the US appt and an earlier appt with one of us if she desires?      Thanks!  Trudy STORY, SALLY

## 2019-12-31 NOTE — PROGRESS NOTES
"  SUBJECTIVE:                                                   Carolyn Cerda is a 19 year old who presents to clinic today for the following health issue(s):  Patient presents with:  Prenatal Care      HPI:  Carolyn has multiple concerns today. Here with partner.   1. US at [facility] today due to continue right sided pain. Feels better today, was very painful a few days ago.     2. Vaginal discharge, she thinks her water may have broken, continued leaking for a few days, denies itching. Difficulty describing type of discharge.     3. Asthma symptoms returning would like rx for inhaler, can't remmeber the name of it but used to have a \"purple disk, a pill, and a puff inhaler\"    4. Back/sciatic pain reports she had an accident in [place] where a horse fell on her and she was paralyzed for 2 days. Never broke anything but wore neck brace for 2 months and had swelling \"in her spinal column.\" never did physical therapy or had much follow as she states that the resources were not great in [place].     No LMP recorded (lmp unknown). Patient is pregnant.    Patient is sexually active  .  Problem list and histories reviewed & adjusted, as indicated.  Additional history: as documented.    Patient Active Problem List   Diagnosis     Supervision of normal pregnancy     Past Surgical History:   Procedure Laterality Date     NO HISTORY OF SURGERY        Social History     Tobacco Use     Smoking status: Never Smoker     Smokeless tobacco: Never Used   Substance Use Topics     Alcohol use: Never     Frequency: Never      Problem (# of Occurrences) Relation (Name,Age of Onset)    Lupus (1) Mother            Current Outpatient Medications   Medication Sig     albuterol (PROAIR HFA/PROVENTIL HFA/VENTOLIN HFA) 108 (90 Base) MCG/ACT inhaler Inhale 2 puffs into the lungs every 6 hours     Prenatal Vit-Fe Fumarate-FA (PRENATAL MULTIVITAMIN W/IRON) 27-0.8 MG tablet Take 1 tablet by mouth daily     VITAMIN D PO      metroNIDAZOLE " (FLAGYL) 500 MG tablet Take 1 tablet (500 mg) by mouth 2 times daily for 7 days     No current facility-administered medications for this visit.      Allergies   Allergen Reactions     Fig Extract [Ficus] Anaphylaxis     fig       ROS:  12 point review of systems negative other than symptoms noted below or in the HPI.  Respiratory: Wheezing  Genitourinary: Pelvic Pain and Vaginal Discharge    OBJECTIVE:     /76   Wt 64.4 kg (142 lb)   LMP  (LMP Unknown)   BMI 25.15 kg/m    Body mass index is 25.15 kg/m .    PHYSICAL EXAM:  Constitutional:  Appearance: Well nourished, well developed alert, in no acute distress  Chest:  Respiratory Effort:  Breathing unlabored. Clear to auscultation bilaterally.  PELVIC EXAM:  Vulva: No external lesions, BUS WNL, discomfort with speculum insertion  Vagina: Moist, pink, discharge consistent with BV  well rugated, no lesions  + whiff test  Cervix:midposition, smooth, pink, no visible lesions, neg CMT, closed  Uterus: Normal size, anteverted, non-tender, mobile  Ovaries: No mass, non-tender  Rectal exam: deferred     Neurologic:  Mental Status:  Oriented X3.  Normal strength and tone, sensory exam grossly normal, mentation intact and speech normal.       In-Clinic Test Results:  Results for orders placed or performed in visit on 01/03/20 (from the past 24 hour(s))   Wet prep   Result Value Ref Range    Specimen Description Vagina     Wet Prep Clue cells seen  Many   (A)     Wet Prep No Trichomonas seen     Wet Prep No yeast seen     Wet Prep No WBC's seen        ASSESSMENT/PLAN:                                                        ICD-10-CM    1. Vaginal discharge N89.8 Wet prep   2. Complex cyst of left ovary N83.292    3. Pregnancy with abdominal pain of right lower quadrant, antepartum O26.899     R10.31    4. Asthma complicating pregnancy, antepartum O99.519 albuterol (PROAIR HFA/PROVENTIL HFA/VENTOLIN HFA) 108 (90 Base) MCG/ACT inhaler    J45.909 INTERNAL MEDICINE  REFERRAL   5. Low back pain during pregnancy in first trimester O26.891 PHYSICAL THERAPY REFERRAL    M54.5        COUNSELING:  Discussed vaginal discharge normal verus abnormal in pregnancy  Rx sent by on call CNM to treat BV  Referral to internal medicine for evaluation and treatment of asthma which has worsened/returned since pregnancy  Rx sent for albuterol until she can get care with internal medicine   Discuss US results, left complex cyst, will continue to monitor  Severe pain she felt may have been another cyst rupturing due to presence of free fluid in pelvis,  Viable IUP on US today  Continue to monitor pain, if worsens may need to be seen in ED, concern for ovarian torsion which is emergent condition.   Lengthy discussion about normal pregnancy aches, pains, discomforts, may use heat, ice, tylenol, if interfering with daily activity recommend f/u with chiropractor/physical therapy  Discussed exercise/core strengthening   Patient to try to get accident records from [place] to help with treament for pain here    Next visit offer flu vaccine/ask about genetic testing      40 minutes was spent face to face with the patient today discussing her history, diagnosis, and follow-up plan as noted above. Over 50% of the visit was spent in counseling and coordination of care.    Total Visit Time: 40 minutes.       PORSHA Saldaña, SALLY

## 2019-12-31 NOTE — TELEPHONE ENCOUNTER
Spoke with patient.  No URI symptoms at this time but when she goes out into the cold, she has difficulty breathing.  Last Rx inhaler is  and was from [place] - [facility]xent Beclometas[facility] but says that it is not for pregnancy and wanted recommendation.  Has had asthma since childhood but over the years more controlled and just needs the inhaler occasionally.    Informed of provider comments regarding pain and would like to be seen this week as she hasn't been able to work. Will discuss the inhaler prescription at upcoming appointment. Pt verbalized understanding, in agreement with plan, and voiced no further questions.  Transferred to scheduling.  Josefina Rincon, RN on 2019 at 12:32 PM

## 2020-01-03 ENCOUNTER — ANCILLARY PROCEDURE (OUTPATIENT)
Dept: ULTRASOUND IMAGING | Facility: CLINIC | Age: 20
End: 2020-01-03
Payer: COMMERCIAL

## 2020-01-03 ENCOUNTER — PRENATAL OFFICE VISIT (OUTPATIENT)
Dept: MIDWIFE SERVICES | Facility: CLINIC | Age: 20
End: 2020-01-03
Payer: COMMERCIAL

## 2020-01-03 VITALS — WEIGHT: 142 LBS | BODY MASS INDEX: 25.15 KG/M2 | DIASTOLIC BLOOD PRESSURE: 76 MMHG | SYSTOLIC BLOOD PRESSURE: 110 MMHG

## 2020-01-03 DIAGNOSIS — M54.50 LOW BACK PAIN DURING PREGNANCY IN FIRST TRIMESTER: ICD-10-CM

## 2020-01-03 DIAGNOSIS — O34.81 OVARIAN CYST AFFECTING PREGNANCY IN FIRST TRIMESTER, ANTEPARTUM: ICD-10-CM

## 2020-01-03 DIAGNOSIS — J45.909 ASTHMA COMPLICATING PREGNANCY, ANTEPARTUM: ICD-10-CM

## 2020-01-03 DIAGNOSIS — N83.292 COMPLEX CYST OF LEFT OVARY: ICD-10-CM

## 2020-01-03 DIAGNOSIS — O26.891 LOW BACK PAIN DURING PREGNANCY IN FIRST TRIMESTER: ICD-10-CM

## 2020-01-03 DIAGNOSIS — N83.209 OVARIAN CYST AFFECTING PREGNANCY IN FIRST TRIMESTER, ANTEPARTUM: ICD-10-CM

## 2020-01-03 DIAGNOSIS — O99.519 ASTHMA COMPLICATING PREGNANCY, ANTEPARTUM: ICD-10-CM

## 2020-01-03 DIAGNOSIS — R10.31 PREGNANCY WITH ABDOMINAL PAIN OF RIGHT LOWER QUADRANT, ANTEPARTUM: ICD-10-CM

## 2020-01-03 DIAGNOSIS — N89.8 VAGINAL DISCHARGE: Primary | ICD-10-CM

## 2020-01-03 DIAGNOSIS — N76.0 BV (BACTERIAL VAGINOSIS): Primary | ICD-10-CM

## 2020-01-03 DIAGNOSIS — B96.89 BV (BACTERIAL VAGINOSIS): Primary | ICD-10-CM

## 2020-01-03 DIAGNOSIS — O26.899 PREGNANCY WITH ABDOMINAL PAIN OF RIGHT LOWER QUADRANT, ANTEPARTUM: ICD-10-CM

## 2020-01-03 LAB
SPECIMEN SOURCE: ABNORMAL
WET PREP SPEC: ABNORMAL

## 2020-01-03 PROCEDURE — 87210 SMEAR WET MOUNT SALINE/INK: CPT | Performed by: ADVANCED PRACTICE MIDWIFE

## 2020-01-03 PROCEDURE — 76801 OB US < 14 WKS SINGLE FETUS: CPT | Performed by: OBSTETRICS & GYNECOLOGY

## 2020-01-03 PROCEDURE — 99215 OFFICE O/P EST HI 40 MIN: CPT | Performed by: ADVANCED PRACTICE MIDWIFE

## 2020-01-03 RX ORDER — METRONIDAZOLE 500 MG/1
500 TABLET ORAL 2 TIMES DAILY
Qty: 14 TABLET | Refills: 0 | Status: SHIPPED | OUTPATIENT
Start: 2020-01-03 | End: 2020-03-13

## 2020-01-03 RX ORDER — ALBUTEROL SULFATE 90 UG/1
2 AEROSOL, METERED RESPIRATORY (INHALATION) EVERY 6 HOURS
Qty: 1 INHALER | Refills: 0 | Status: SHIPPED | OUTPATIENT
Start: 2020-01-03 | End: 2020-07-14

## 2020-01-07 PROBLEM — J45.909 ASTHMA AFFECTING PREGNANCY IN FIRST TRIMESTER: Status: ACTIVE | Noted: 2020-01-07

## 2020-01-07 PROBLEM — O99.511 ASTHMA AFFECTING PREGNANCY IN FIRST TRIMESTER: Status: ACTIVE | Noted: 2020-01-07

## 2020-01-22 NOTE — PROGRESS NOTES
Patient feels good.  Here with Neftali today.  She states that nausea is almost completely gone.  Also states that abd cramping is resolved and asthma signs and symptoms controlled with inhaler.   Educated about diet, exercise and normal weight gain  Normal to feel movement between 18-22 weeks  Reviewed labs from 1st OB  GC/CT declined  Discussed genetic screening; patient has not had Innatal yet, is considering.  Will make lab only appointment if desired.  Warning signs discussed  Return to clinic 4-5 weeks for anatomy US and OB visit    Trudy STORY CNM

## 2020-01-22 NOTE — PATIENT INSTRUCTIONS
Remedies for nausea and vomiting with pregnancy      Eat small frequent meals every 2-3 hours if possible.       Avoid food at extremes of temperature and drinks with carbonation.      Eat foods that appeal to you, avoiding fats and spicy foods.      Avoid liquids with foods.  Drink liquids 30-60 minutes before or after eating and sip slowly.      Bread, pasta, crackers, potatoes, and rice tend to be tolerated the best.      Don't worry about what you eat in the first 3 months, it is more important that you can eat and keep it down.       Try flat ginger ale or ginger tea      Before rising in the morning, eat a small amount of crackers or dry toast.      Peppermint tea      Ginger is a herbal remedy for nausea and you can use it in any form.  There are ginger tablets you can purchase.  The dose 1000 mg a day in divided doses.       You may also try doxylamine (Unisom) 12.5 mg three times a day which is a sleeping medication along with Vitamin B6 25 mg three times a day.  This combination takes up to a week to work so give it some time.       Benadryl (diphenhydramine) 25-50 mg every 8 hour or Dramamine (dimenhydrinate)  mg by mouth every 4-6 hours. Both of these medication may cause some drowsiness      Other things that may help include an Accupressure band and acupuncture      If these methods fail there are many prescriptions that we can try    If you begin to vomit more than 5 or 6 times a day and feel that you are unable to keep anything down, call the [facility] at [phone]  Genetic Screening in Pregnancy    There are several options you have for genetic screening in your pregnancy.  Everyone has their own personal reasons to screen or not to screen.  We want you to make the best choice for you and your pregnancy.  Below is a list of options, what they screen for and when the screening is done.  Genetic screening is recommended for women who are 35 and older at delivery and for those  "with a family history of chromosomal abnormalities. However, screening is offered to all women.    Innatal:    This is a screening for the more common chromosome abnormalities, including trisomy 21 (Down's syndrome), trisomy 18, trisomy 13 and sex chromosome abnormalities. This is a prenatal test that can be done as early as 10 weeks gestation.       A maternal blood sample is drawn that sequences cell-free DNA in maternal blood stream. This in turn allows for molecular counting of chromosome copy numbers with a >99% detection rate of Down syndrome, a 97% detection rate of Trisomy 18, and a 78% detection rate of Trisomy 13.    This test will give information about the gender of the baby.  You can choose to not have that disclosed.       Results come back within 10-14 business days.     About 5% of samples will have results that are designated as \"indeterminate\" or \"uninterruptable.\" With this type of result, genetic counseling and diagnostic testing are recommended. Remember this is a screening test and does not definitively diagnose or exclude the presence of these chromosome conditions.     This screening may or may not be covered by insurance.  We recommend you consult your insurance company to discuss.  Financial assistance is available at a low cost if not covered by your insurance.       Standard Screen:  This test screens for 23 disorders that cause serious health effects in infancy or childhood.  Most hereditary genetic disorders are autosomal recessive, meaning both parents must be carriers for the child to be at risk.  There are some X-linked disorders where only the mother needs to be a carrier for the child to be at risk.   Below are some of the more common of the 23 disorders screened for:     1.  Cystic Fibrosis (CF) is the most common life-shortening autosomal  recessive disease among  populations, with a frequency of 1 in  Every 3,500 live births. Although it mainly affects the  " " Population anyone can request screening. If you have a family history of  cystic fibrosis you should request this test.  This screening is a blood  draw.      2.  Spinal Muscular Atrophy (SMA) is the most common inherited cause  of infant death.  The most common form of this disorder causes death by a age two.  One in every 6,000 to 10,000 babies born in the  has SMA.      3.  Fragile X Syndrome (FXS) is the most common inherited cause of  intellectual disability.  Approximately 1 in every 3,600 boys and 1 in every  6,000 girls is born with FXS.      4.  Hemoglobinopathy Evaluation:  Hemoglobin electrophoresis is a  non-invasive blood test that looks at abnormal hemoglobin (component of  red blood cells) production. Examples of this include sickle cell anemia  (which is a disorder of the red blood cells and their shape) and the  thalassemia s (which is also a form of anemia).  High risk groups include:   , Southeast Asians, , Mediterranean, Tristanian,  South and Central American and Mauricio descent. This is a one-time  test.     5. Yousif-Sachs (optional):  Is a disorder that results when an enzyme that            helps break down fatty substances is absent.  This is a fatal disorder.                This is most commonly passed from parents who are of Ashkenazi Jainism  descent. One in four to one in five individuals of Ashkenazi Jainism  descent carry a mutation for one of the autosomal recessive disorders  included in a group of disorders sometimes call \"Jainism genetic  Disorders\".      **If you are a carrier of CF, SMA, or a hemoglobinopathy, your partner will also need to be tested. This partner testing is offered at a reduced cost.  This screen can be done prior to pregnancy or at any time during the pregnancy.      Maternal Serum AFP  The screening is ideally done between 15 and 18 weeks of pregnancy but can be done up to week 24. Even if you have done the Innatal testing you can still get a " single AFP drawn to check for neural tube defects like Spina Bifida.       Anatomy Ultrasound:    This is a fetal anatomy survey done around 20 weeks gestation.  This ultrasound will look at the heart structure, heart activity, fetal heart rate and rhythm, assessment of the amniotic fluid volume, placenta appearance and location, the umbilical cord and placental insertion site.  They also do many fetal measurements to make sure the baby is growing properly.  The cervical length is also measured and fetal movement is evaluated.  The gender of the baby can usually be determined.      In the event of a positive screen:    If any screening tests come back with an increased risk, you will be referred to Maternal Fetal Medicine to be seen by a genetic counselor and a doctor.  They will assess your risk and see if further diagnostic testing is warranted.  The options for diagnosis that may be offered are: chorionic villus sampling (CVS), usually done at 11 to 12 weeks of pregnancy and amniocentesis which is generally done later in pregnancy around 15 to 20 weeks.  All risks/benefits would be explained and you can decide what course of action is best for you and your family.    Why you might choose to screen?    A desire to know as much as you can about your baby    If your baby had a genetic abnormality you can learn about it before they are born    Choose whether to continue the pregnancy or to terminate    Why you might choose to NOT screen?    You feel that whatever happens is fine and you would not terminate    You know you don't want to do any diagnostic tests even if the screening test showed a high possibility of a genetic abnormality      For further information please call:  [facility]   [phone]        Genetic Screening in Pregnancy    There are several options you have for genetic screening in your pregnancy.  Everyone has their own personal reasons to screen or not to screen.  We want you to  "make the best choice for you and your pregnancy.  Below is a list of options, what they screen for and when the screening is done.  Genetic screening is recommended for women who are 35 and older at delivery and for those with a family history of chromosomal abnormalities. However, screening is offered to all women.    Innatal:    This is a screening for the more common chromosome abnormalities, including trisomy 21 (Down's syndrome), trisomy 18, trisomy 13 and sex chromosome abnormalities. This is a prenatal test that can be done as early as 10 weeks gestation.       A maternal blood sample is drawn that sequences cell-free DNA in maternal blood stream. This in turn allows for molecular counting of chromosome copy numbers with a >99% detection rate of Down syndrome, a 97% detection rate of Trisomy 18, and a 78% detection rate of Trisomy 13.    This test will give information about the gender of the baby.  You can choose to not have that disclosed.       Results come back within 10-14 business days.     About 5% of samples will have results that are designated as \"indeterminate\" or \"uninterruptable.\" With this type of result, genetic counseling and diagnostic testing are recommended. Remember this is a screening test and does not definitively diagnose or exclude the presence of these chromosome conditions.     This screening may or may not be covered by insurance.  We recommend you consult your insurance company to discuss.  Financial assistance is available at a low cost if not covered by your insurance.       Standard Screen:  This test screens for 23 disorders that cause serious health effects in infancy or childhood.  Most hereditary genetic disorders are autosomal recessive, meaning both parents must be carriers for the child to be at risk.  There are some X-linked disorders where only the mother needs to be a carrier for the child to be at risk.   Below are some of the more common of the 23 disorders screened " "for:     1.  Cystic Fibrosis (CF) is the most common life-shortening autosomal  recessive disease among  populations, with a frequency of 1 in  Every 3,500 live births. Although it mainly affects the   Population anyone can request screening. If you have a family history of  cystic fibrosis you should request this test.  This screening is a blood  draw.      2.  Spinal Muscular Atrophy (SMA) is the most common inherited cause  of infant death.  The most common form of this disorder causes death by a age two.  One in every 6,000 to 10,000 babies born in the  has SMA.      3.  Fragile X Syndrome (FXS) is the most common inherited cause of  intellectual disability.  Approximately 1 in every 3,600 boys and 1 in every  6,000 girls is born with FXS.      4.  Hemoglobinopathy Evaluation:  Hemoglobin electrophoresis is a  non-invasive blood test that looks at abnormal hemoglobin (component of  red blood cells) production. Examples of this include sickle cell anemia  (which is a disorder of the red blood cells and their shape) and the  thalassemia s (which is also a form of anemia).  High risk groups include:   , Southeast Asians, , Mediterranean, Welsh,  South and Central American and Mauricio descent. This is a one-time  test.     5. Yousif-Sachs (optional):  Is a disorder that results when an enzyme that            helps break down fatty substances is absent.  This is a fatal disorder.                This is most commonly passed from parents who are of Ashkenazi Hoahaoism  descent. One in four to one in five individuals of Ashkenazi Hoahaoism  descent carry a mutation for one of the autosomal recessive disorders  included in a group of disorders sometimes call \"Hoahaoism genetic  Disorders\".      **If you are a carrier of CF, SMA, or a hemoglobinopathy, your partner will also need to be tested. This partner testing is offered at a reduced cost.  This screen can be done prior to pregnancy or " at any time during the pregnancy.      Maternal Serum AFP  The screening is ideally done between 15 and 18 weeks of pregnancy but can be done up to week 24. Even if you have done the Innatal testing you can still get a single AFP drawn to check for neural tube defects like Spina Bifida.       Anatomy Ultrasound:    This is a fetal anatomy survey done around 20 weeks gestation.  This ultrasound will look at the heart structure, heart activity, fetal heart rate and rhythm, assessment of the amniotic fluid volume, placenta appearance and location, the umbilical cord and placental insertion site.  They also do many fetal measurements to make sure the baby is growing properly.  The cervical length is also measured and fetal movement is evaluated.  The gender of the baby can usually be determined.      In the event of a positive screen:    If any screening tests come back with an increased risk, you will be referred to Maternal Fetal Medicine to be seen by a genetic counselor and a doctor.  They will assess your risk and see if further diagnostic testing is warranted.  The options for diagnosis that may be offered are: chorionic villus sampling (CVS), usually done at 11 to 12 weeks of pregnancy and amniocentesis which is generally done later in pregnancy around 15 to 20 weeks.  All risks/benefits would be explained and you can decide what course of action is best for you and your family.    Why you might choose to screen?    A desire to know as much as you can about your baby    If your baby had a genetic abnormality you can learn about it before they are born    Choose whether to continue the pregnancy or to terminate    Why you might choose to NOT screen?    You feel that whatever happens is fine and you would not terminate    You know you don't want to do any diagnostic tests even if the screening test showed a high possibility of a genetic abnormality      For further information please call:  [facility]   [phone]

## 2020-01-24 ENCOUNTER — PRENATAL OFFICE VISIT (OUTPATIENT)
Dept: MIDWIFE SERVICES | Facility: CLINIC | Age: 20
End: 2020-01-24
Payer: COMMERCIAL

## 2020-01-24 VITALS — WEIGHT: 140 LBS | DIASTOLIC BLOOD PRESSURE: 70 MMHG | SYSTOLIC BLOOD PRESSURE: 112 MMHG | BODY MASS INDEX: 24.8 KG/M2

## 2020-01-24 DIAGNOSIS — Z13.79 GENETIC SCREENING: ICD-10-CM

## 2020-01-24 DIAGNOSIS — N83.202 CYST OF LEFT OVARY: ICD-10-CM

## 2020-01-24 DIAGNOSIS — J45.909 ASTHMA COMPLICATING PREGNANCY, ANTEPARTUM: Primary | ICD-10-CM

## 2020-01-24 DIAGNOSIS — O99.519 ASTHMA COMPLICATING PREGNANCY, ANTEPARTUM: Primary | ICD-10-CM

## 2020-01-24 DIAGNOSIS — Z3A.14 14 WEEKS GESTATION OF PREGNANCY: ICD-10-CM

## 2020-01-24 DIAGNOSIS — Z23 NEED FOR PROPHYLACTIC VACCINATION AND INOCULATION AGAINST INFLUENZA: ICD-10-CM

## 2020-01-24 DIAGNOSIS — Z34.82 ENCOUNTER FOR SUPERVISION OF OTHER NORMAL PREGNANCY IN SECOND TRIMESTER: ICD-10-CM

## 2020-01-24 PROBLEM — O99.511 ASTHMA AFFECTING PREGNANCY IN FIRST TRIMESTER: Status: RESOLVED | Noted: 2020-01-07 | Resolved: 2020-01-24

## 2020-01-24 PROCEDURE — 90471 IMMUNIZATION ADMIN: CPT | Performed by: NURSE PRACTITIONER

## 2020-01-24 PROCEDURE — 99207 ZZC PRENATAL VISIT: CPT | Performed by: NURSE PRACTITIONER

## 2020-01-24 PROCEDURE — 90686 IIV4 VACC NO PRSV 0.5 ML IM: CPT | Performed by: NURSE PRACTITIONER

## 2020-01-27 ENCOUNTER — THERAPY VISIT (OUTPATIENT)
Dept: PHYSICAL THERAPY | Facility: CLINIC | Age: 20
End: 2020-01-27
Attending: ADVANCED PRACTICE MIDWIFE
Payer: COMMERCIAL

## 2020-01-27 DIAGNOSIS — M54.9 BACK PAIN DURING PREGNANCY: ICD-10-CM

## 2020-01-27 DIAGNOSIS — O99.891 BACK PAIN DURING PREGNANCY: ICD-10-CM

## 2020-01-27 DIAGNOSIS — O26.891 LOW BACK PAIN DURING PREGNANCY IN FIRST TRIMESTER: ICD-10-CM

## 2020-01-27 DIAGNOSIS — M54.50 LOW BACK PAIN DURING PREGNANCY IN FIRST TRIMESTER: ICD-10-CM

## 2020-01-27 PROCEDURE — 97535 SELF CARE MNGMENT TRAINING: CPT | Mod: GP | Performed by: PHYSICAL THERAPIST

## 2020-01-27 PROCEDURE — 97110 THERAPEUTIC EXERCISES: CPT | Mod: GP | Performed by: PHYSICAL THERAPIST

## 2020-01-27 PROCEDURE — 97161 PT EVAL LOW COMPLEX 20 MIN: CPT | Mod: GP | Performed by: PHYSICAL THERAPIST

## 2020-01-27 NOTE — PROGRESS NOTES
[facility] Initial Evaluation  Subjective:  The history is provided by the patient. No  was used.   Carolyn Cerda being seen for back pain.   Date of Onset: 1/3/2020, date of order. Problem occurred: horse accident 2014, having pain on/ff  and reported as 5/10 on pain scale. General health as reported by patient is good. Health conditions: asthma, numbness/tingling.    Surgeries include:  None.  Current medications:  None.   Primary job tasks include:  Lifting/carrying and repetitive tasks.                  Type of problem:  Lumbar and thoracic   Condition occurred with:  A fall/slip and insidious onset. This is a chronic condition    Patient reports pain:  Central lumbar spine, mid thoracic spine, SI joint left and SI joint right. Radiates to:  Gluteals right, lower leg right and foot right. Associated symptoms:  Numbness and pregnancy. Symptoms are exacerbated by standing and walking Relieved by: pregnancy yoga occasionally.                    History of current episode:    Onset date/MD order: 1/3/2020, date of order.    CC/Present symptoms: The pt presents toady 14 weeks pregnant with back and right sided abdominal/leg pain. Pt notes in 2014 she was in a horse accident where the horse fell on her. The patient was living in [place]. She reports being paralized for two days. She notes they said nothing was broken and she went home with pain killers. The pt did not do any therapy since the fall.     Pt stopped going to work two weeks ago due to the numbness down her right leg. She was working at [email or web address] where she was standing, twisting, and lifting quite a bit. Her symptoms have improved since she has stopped working the past two weeks. She reports increase in pain with walking and standing, and a reduction of symptoms with rest and sitting. The pt has constant tightness/pressure pain in her thoracic spine due to the horse accident. Pt sleeps on her left side primarily due to  pain symptoms.    Current occupation: [employer]  Current activity: limited due to pain.  Goals: reduce back and body pain, sleep longer  Red flags:none    Urination:  Do you leak on the way to the bathroom or with a strong urge to void? no   Do you leak with cough,sneeze, jumping, running? no  Any other activities that cause leaking?  n/a  Do you have triggers that make you feel you can't wait to go to the bathroom?  What are they? Just baby related with prgenancy.  Type of pad and number used per day? n/a  When you leak what is the amount? n/a  How long can you delay the need to urinate? 10 min.   Frequency of daytime urination:not asked  Frequency of nighttime urination:4x  Can you stop the flow of urine when on the toilet? no  Is the volume of urine passed usually:  (8sec rule= 250ml with average bladder storing 400-600ml) medium  Do you strain to pass urine? no  Do you have a slow or hesitant urinary stream? no  Do you have difficulty initiating the urine stream? no  Is urination painful? no  How many bladder infections have you had in last 12 months? 0  Fluid intake(one glass is 8oz or one cup) 6, 16oz/day, 0 caffinated glasses/day  0 alcohol glasses/day.    Bowel habits:  Frequency of bowel movements? 2 times a week  Consistancy of stool? soft  Do you ignore the urge to defecate? no  Do you strain to pass stool? sometimes    Pelvic Pain:  Do you have any pelvic pain with intercourse, exams, use of tampons? no  Is initial penetration during intercourse painful? no  Is deeper penetration painful? no  Do you use lubricant?  no  Are you sexually active? yes  Have you ever been worried for your physical safety? yes  Have you practiced the PF(kegel) exercises for 4 or more weeks?yes  Have you given birth? miscarriage at 14 weeks  Any abdominal or pelvic surgeries? No  Are you having regular exercise? No. I only exercise when I don't have pain, which is about 2-4x a month for 40 minutes  Marinoff Scale:level 1, R lower  abdominal discomfort for a few moments.  (Level 3: Abstinence from intercourse because of severe pain. Level 2: Painful intercourse which limites frequency of activity. Level 1: Painful intercourse not severe enough to prevent activity.)    Treatment/Education provided this session: please see flow sheet for details      Objective:  Standing Alignment:    Cervical/Thoracic:  Flat thoracic upper spine                    Flexibility/Screens:       Lower Extremity:  Decreased left lower extremity flexibility:Piriformis and Hamstrings    Decreased right lower extremity flexibility:  Piriformis and Hamstrings               Lumbar/SI Evaluation  ROM:  Arom wnl lumbar: WNL, decrease in lumbar pain with end range flexion and extension.            Lumbar Dermtomes:  normal                                                  Pelvic Dysfunction Evaluation:        Flexibility:    Tightness present at:Hamstrings; Piriformis and Gluteals        SI Provocation:    Positive for: SI Compression; Fabres and ASLR                      Hip Evaluation      Hip Special Testing:    Left hip positive for the following special tests:  Serge  Left hip negative for the following special tests:  SLR   Right hip positive for the following special tests:  Serge and SLR    Hip Palpation:      Right hip tenderness present at:  Piriformis             General     ROS    Assessment/Plan:    Patient is a 19 year old female with pelvic complaints.    Patient has the following significant findings with corresponding treatment plan.                Diagnosis 1:  Pelvic girdle pain in pregnancy  Pain -  hot/cold therapy, US, electric stimulation, mechanical traction, manual therapy, STS, splint/taping/bracing/orthotics, self management, education, directional preference exercise and home program  Decreased ROM/flexibility - manual therapy, therapeutic exercise, therapeutic activity and home program  Decreased strength - therapeutic exercise, therapeutic  activities and home program  Impaired muscle performance - biofeedback, electric stimulation, neuro re-education and home program  Impaired posture - neuro re-education, therapeutic activities and home program    Therapy Evaluation Codes:   Cumulative Therapy Evaluation is: Low complexity.    Previous and current functional limitations:  (See Goal Flow Sheet for this information)    Short term and Long term goals: (See Goal Flow Sheet for this information)     Communication ability:  Patient appears to be able to clearly communicate and understand verbal and written communication and follow directions correctly.  Treatment Explanation - The following has been discussed with the patient:   RX ordered/plan of care  Anticipated outcomes  Possible risks and side effects  This patient would benefit from PT intervention to resume normal activities.   Rehab potential is good.    Frequency:  1 X week, once daily  Duration:  for 4 weeks tapering to 2 X a month over 2 months  Discharge Plan:  Achieve all LTG.  Independent in home treatment program.  Reach maximal therapeutic benefit.    Please refer to the daily flowsheet for treatment today, total treatment time and time spent performing 1:1 timed codes.

## 2020-02-03 ENCOUNTER — THERAPY VISIT (OUTPATIENT)
Dept: PHYSICAL THERAPY | Facility: CLINIC | Age: 20
End: 2020-02-03
Payer: COMMERCIAL

## 2020-02-03 DIAGNOSIS — M54.9 BACK PAIN DURING PREGNANCY: ICD-10-CM

## 2020-02-03 DIAGNOSIS — O99.891 BACK PAIN DURING PREGNANCY: ICD-10-CM

## 2020-02-03 PROCEDURE — 97140 MANUAL THERAPY 1/> REGIONS: CPT | Mod: GP | Performed by: PHYSICAL THERAPIST

## 2020-02-03 PROCEDURE — 97112 NEUROMUSCULAR REEDUCATION: CPT | Mod: GP | Performed by: PHYSICAL THERAPIST

## 2020-02-03 PROCEDURE — 97110 THERAPEUTIC EXERCISES: CPT | Mod: GP | Performed by: PHYSICAL THERAPIST

## 2020-02-08 ENCOUNTER — NURSE TRIAGE (OUTPATIENT)
Dept: NURSING | Facility: CLINIC | Age: 20
End: 2020-02-08

## 2020-02-09 NOTE — TELEPHONE ENCOUNTER
"19 wk pregnant. C/o sore throat. Declined triage - requests advice on what she may safely take for pain relief. Advised acetaminophen 650 - 1000 mg q 4-6 hr PRN. Max 3000 mg /24 hr. Do not use ibuprofen.     Reason for Disposition    Caller requesting information about medication during pregnancy; adult is not ill and triager answers question    Caller has medication question, adult has minor symptoms, caller declines triage, and triager answers question    Additional Information    Negative: Drug overdose and nurse unable to answer question    Negative: Caller requesting information not related to medicine    Negative: Caller requesting a prescription for Strep throat and has a positive culture result    Negative: Rash while taking a medication or within 3 days of stopping it    Negative: Immunization reaction suspected    Negative: [1] Asthma and [2] having symptoms of asthma (cough, wheezing, etc)    Negative: MORE THAN A DOUBLE DOSE of a prescription or over-the-counter (OTC) drug    Negative: [1] DOUBLE DOSE (an extra dose or lesser amount) of over-the-counter (OTC) drug AND [2] any symptoms (e.g., dizziness, nausea, pain, sleepiness)    Negative: [1] DOUBLE DOSE (an extra dose or lesser amount) of prescription drug AND [2] any symptoms (e.g., dizziness, nausea, pain, sleepiness)    Negative: Took another person's prescription drug    Negative: [1] DOUBLE DOSE (an extra dose or lesser amount) of prescription drug AND [2] NO symptoms (Exception: a double dose of antibiotics)    Negative: Diabetes drug error or overdose (e.g., insulin or extra dose)    Negative: [1] Request for URGENT new prescription or refill of \"essential\" medication (i.e., likelihood of harm to patient if not taken) AND [2] triager unable to fill per unit policy    Negative: [1] Prescription not at pharmacy AND [2] was prescribed today by PCP    Negative: Pharmacy calling with prescription questions and triager unable to answer question    " Negative: Caller has URGENT medication question about med that PCP prescribed and triager unable to answer question    Negative: Caller has NON-URGENT medication question about med that PCP prescribed and triager unable to answer question    Negative: Caller requesting a NON-URGENT new prescription or refill and triager unable to refill per unit policy    Negative: Caller has medication question about med not prescribed by PCP and triager unable to answer question (e.g., compatibility with other med, storage)    Negative: [1] DOUBLE DOSE (an extra dose or lesser amount) of over-the-counter (OTC) drug AND [2] NO symptoms    Negative: [1] DOUBLE DOSE (an extra dose or lesser amount) of antibiotic drug AND [2] NO symptoms    Negative: Caller has medication question only, adult not sick, and triager answers question    Protocols used: MEDICATION QUESTION CALL-A-

## 2020-03-02 ENCOUNTER — THERAPY VISIT (OUTPATIENT)
Dept: PHYSICAL THERAPY | Facility: CLINIC | Age: 20
End: 2020-03-02
Payer: COMMERCIAL

## 2020-03-02 DIAGNOSIS — O99.891 BACK PAIN DURING PREGNANCY: ICD-10-CM

## 2020-03-02 DIAGNOSIS — M54.9 BACK PAIN DURING PREGNANCY: ICD-10-CM

## 2020-03-02 PROCEDURE — 97535 SELF CARE MNGMENT TRAINING: CPT | Mod: GP | Performed by: PHYSICAL THERAPIST

## 2020-03-02 PROCEDURE — 97110 THERAPEUTIC EXERCISES: CPT | Mod: GP | Performed by: PHYSICAL THERAPIST

## 2020-03-02 PROCEDURE — 97140 MANUAL THERAPY 1/> REGIONS: CPT | Mod: GP | Performed by: PHYSICAL THERAPIST

## 2020-03-04 ENCOUNTER — TELEPHONE (OUTPATIENT)
Dept: MIDWIFE SERVICES | Facility: CLINIC | Age: 20
End: 2020-03-04

## 2020-03-04 NOTE — TELEPHONE ENCOUNTER
20w0d  Pain since last night in her lower abdomen but more on her right side low and also describing a pain in upper abdomen about 3 fingers up from umbilicus.    Denies cramping, LOF or VB  Denies fever, burning/pain with urination or UTI sx's  Normal BM today; not constipated but feeling bloated  Lower back pain in the middle of the spine.  Felt FM x2 today - but this is new to her.    Has not tried Tylenol.    Described normal pregnancy changes - muscle/ligament stretching/pains;  Encouraged ES Tylenol 1000mg every 6 hrs, mild heat to abdomen, warm tub bath and to call with cramping/uterine tightening, LOF, VB or any sx's of UTI/Vaginal issues - may make apt to be seen in clinic for eval and reassurance.   She plans to take Tylenol and warm tub bath; she will call back and make apt if things don't improve.    Pt verbalized understanding, in agreement with plan, and voiced no further questions.  Emerald Valle RN on 3/4/2020 at 8:50 AM

## 2020-03-11 NOTE — PATIENT INSTRUCTIONS
Recommendations for total and rate of weight gain during pregnancy, by prepregnancy BMI    Total weight gain Rates of weight gain*  2nd and 3rd trimester   Prepregnancy BMI Range in kg Range in lbs Mean (range) in kg/week Mean (range) in lbs/week   Underweight (<18.5 kg/m2) 12.5-18 28-40 0.51 (0.44-0.58) 1 (1-1.3)   Normal weight (18.5-24.9 kg/m2) 11.5-16 25-35 0.42 (0.35-0.50) 1 (0.8-1)   Overweight (25.0-29.9 kg/m2) 7-11.5 15-25 0.28 (0.23-0.33) 0.6 (0.5-0.7)   Obese (?30.0 kg/m2) 5-9 11-20 0.22 (0.17-0.27) 0.5 (0.4-0.6)   BMI: body mass index.  * Calculations assume a 0.5-2 kg (1.1-4.4 lbs) weight gain in the first trimester.  * To calculate BMI go to [email or web address].[email or web address]/      A high fiber diet with plenty of fluids (up to 8 glasses of water daily) is suggested to relieve these symptoms.  Metamucil, 1 tablespoon once or twice daily can be used to keep bowels regular if needed. Citrucel daily is useful as well and causes less gas.    GESTATIONAL DIABETES SCREENING    All pregnant women are screened at least once for diabetes as part of their prenatal care. A woman has gestational diabetes if she has high blood sugars for the first time during pregnancy.      Diabetes can harm your health and the health of your baby.  But if we find the diabetes early in pregnancy we will watch your health closely and prevent further problems.       We will check for gestational diabetes during your visit between 24-28 weeks visit. Please note you can not do this prior to 24 weeks of gestation.      Plan to spend about an hour at the clinic.  When you check in let us know that you will be having your diabetes screening that day.       We will give you a 50 gram glucose drink that you have 5 minutes to consume.  Exactly one hour later you will have draw blood from your arm to check your blood sugar level.      We will call you to let you know if your results are normal.  If the results are normal no more testing will be needed.   If your results are not normal we will discuss follow up testing with you.        You may eat prior to the testing but it is not recommended to eat or drink very sweet things such as pop, juice, candy or dessert type foods.  Eat a high protein, low carb meals prior to testing.    If you have any questions please call:    [facility]    [phone]

## 2020-03-11 NOTE — PROGRESS NOTES
Feels excited to see baby on US today!  Here with Neftali today.  Will be doing a gender reveal later today.   States asthma signs and symptoms are well controlled, rarely using Albuterol inhaler.   Fetal movement: positive   Denies loss of fluid/vb/contractions  Anatomy ultrasound results discussed; to be reviewed by Dr. Sexton, having a Unknown, Placenta:  anterior  GCT visit between 24-28 weeks, handout provided, reminded of longer appointment  Round ligament pain and comfort measures reviewed  Plan for f/u US with appt in 4 weeks  Return to clinic 4 weeks    Trudy STORY CNM

## 2020-03-13 ENCOUNTER — PRENATAL OFFICE VISIT (OUTPATIENT)
Dept: MIDWIFE SERVICES | Facility: CLINIC | Age: 20
End: 2020-03-13
Payer: COMMERCIAL

## 2020-03-13 ENCOUNTER — ANCILLARY PROCEDURE (OUTPATIENT)
Dept: ULTRASOUND IMAGING | Facility: CLINIC | Age: 20
End: 2020-03-13
Payer: COMMERCIAL

## 2020-03-13 VITALS — DIASTOLIC BLOOD PRESSURE: 60 MMHG | SYSTOLIC BLOOD PRESSURE: 106 MMHG | BODY MASS INDEX: 26.25 KG/M2 | WEIGHT: 148.2 LBS

## 2020-03-13 DIAGNOSIS — R93.89 ABNORMAL ULTRASOUND: ICD-10-CM

## 2020-03-13 DIAGNOSIS — J45.909 ASTHMA COMPLICATING PREGNANCY, ANTEPARTUM: Primary | ICD-10-CM

## 2020-03-13 DIAGNOSIS — Z3A.21 21 WEEKS GESTATION OF PREGNANCY: ICD-10-CM

## 2020-03-13 DIAGNOSIS — Z34.82 ENCOUNTER FOR SUPERVISION OF OTHER NORMAL PREGNANCY IN SECOND TRIMESTER: ICD-10-CM

## 2020-03-13 DIAGNOSIS — O99.519 ASTHMA COMPLICATING PREGNANCY, ANTEPARTUM: Primary | ICD-10-CM

## 2020-03-13 PROBLEM — Z34.90 SUPERVISION OF NORMAL PREGNANCY: Status: ACTIVE | Noted: 2019-11-27

## 2020-03-13 PROCEDURE — 76805 OB US >/= 14 WKS SNGL FETUS: CPT | Performed by: OBSTETRICS & GYNECOLOGY

## 2020-03-13 PROCEDURE — 99207 ZZC PRENATAL VISIT: CPT | Performed by: NURSE PRACTITIONER

## 2020-03-16 ENCOUNTER — TELEPHONE (OUTPATIENT)
Dept: MIDWIFE SERVICES | Facility: CLINIC | Age: 20
End: 2020-03-16

## 2020-03-16 NOTE — RESULT ENCOUNTER NOTE
I spoke with the Patient and communicated these results directly. Repeat US in 2 weeks to evaluate spine, order placed. Also discussed repeat US in third trimester to reevaluate pyelectasis.     PORSHA Batista, CNM

## 2020-03-16 NOTE — TELEPHONE ENCOUNTER
Called Carolyn to discuss ultrasound results and recommendations for follow up d/t spinal anatomy not well see and bilateral fetal pyelectasis. Discussed recommendations for repeat anatomy in 2 weeks to reevaluate spine and again at 32-34w to evaluate pyelectasis. Carolyn states understanding of plan, no further questions. Will call to schedule US.     PORSHA Batista, YOLAM

## 2020-03-30 DIAGNOSIS — Z34.82 ENCOUNTER FOR SUPERVISION OF OTHER NORMAL PREGNANCY IN SECOND TRIMESTER: Primary | ICD-10-CM

## 2020-04-02 ENCOUNTER — ANCILLARY PROCEDURE (OUTPATIENT)
Dept: ULTRASOUND IMAGING | Facility: CLINIC | Age: 20
End: 2020-04-02
Attending: ADVANCED PRACTICE MIDWIFE
Payer: COMMERCIAL

## 2020-04-02 ENCOUNTER — PRENATAL OFFICE VISIT (OUTPATIENT)
Dept: MIDWIFE SERVICES | Facility: CLINIC | Age: 20
End: 2020-04-02
Attending: ADVANCED PRACTICE MIDWIFE
Payer: COMMERCIAL

## 2020-04-02 VITALS — WEIGHT: 155 LBS | SYSTOLIC BLOOD PRESSURE: 110 MMHG | DIASTOLIC BLOOD PRESSURE: 62 MMHG | BODY MASS INDEX: 27.46 KG/M2

## 2020-04-02 DIAGNOSIS — Z34.82 ENCOUNTER FOR SUPERVISION OF OTHER NORMAL PREGNANCY IN SECOND TRIMESTER: ICD-10-CM

## 2020-04-02 DIAGNOSIS — Z34.82 ENCOUNTER FOR SUPERVISION OF OTHER NORMAL PREGNANCY IN SECOND TRIMESTER: Primary | ICD-10-CM

## 2020-04-02 DIAGNOSIS — R93.89 ABNORMAL ULTRASOUND: ICD-10-CM

## 2020-04-02 DIAGNOSIS — O99.012 ANEMIA DURING PREGNANCY IN SECOND TRIMESTER: ICD-10-CM

## 2020-04-02 LAB
ERYTHROCYTE [DISTWIDTH] IN BLOOD BY AUTOMATED COUNT: 13.5 % (ref 10–15)
GLUCOSE 1H P 50 G GLC PO SERPL-MCNC: 95 MG/DL (ref 60–129)
HCT VFR BLD AUTO: 30.1 % (ref 35–47)
HGB BLD-MCNC: 10.1 G/DL (ref 11.7–15.7)
MCH RBC QN AUTO: 29.2 PG (ref 26.5–33)
MCHC RBC AUTO-ENTMCNC: 33.6 G/DL (ref 31.5–36.5)
MCV RBC AUTO: 87 FL (ref 78–100)
PLATELET # BLD AUTO: 200 10E9/L (ref 150–450)
RBC # BLD AUTO: 3.46 10E12/L (ref 3.8–5.2)
WBC # BLD AUTO: 12.7 10E9/L (ref 4–11)

## 2020-04-02 PROCEDURE — 82950 GLUCOSE TEST: CPT | Performed by: ADVANCED PRACTICE MIDWIFE

## 2020-04-02 PROCEDURE — 36415 COLL VENOUS BLD VENIPUNCTURE: CPT | Performed by: ADVANCED PRACTICE MIDWIFE

## 2020-04-02 PROCEDURE — 99207 ZZC PRENATAL VISIT: CPT | Performed by: ADVANCED PRACTICE MIDWIFE

## 2020-04-02 PROCEDURE — 85027 COMPLETE CBC AUTOMATED: CPT | Performed by: ADVANCED PRACTICE MIDWIFE

## 2020-04-02 PROCEDURE — 76816 OB US FOLLOW-UP PER FETUS: CPT | Performed by: OBSTETRICS & GYNECOLOGY

## 2020-04-02 RX ORDER — FERROUS GLUCONATE 324(38)MG
324 TABLET ORAL
Qty: 90 TABLET | Refills: 1 | Status: SHIPPED | OUTPATIENT
Start: 2020-04-02 | End: 2020-06-09

## 2020-04-02 NOTE — PROGRESS NOTES
Feels well, no concerns today.   US today to reevaluate spine views. Spine well visualized, fetal kidneys slightly dilated at 3 mm. Awaiting final US report from Dr. Momin, discussed we will only call if f/u is necessary. Understands if normal, no call.   Fetal movement: positive   Denies loss of fluid/vb/contractions  GCT/CBC today.   Tdap next visit; reviewed CDC recommendations and partner/family vaccination recommended as well  Water birth discussed, patient is not interested  Need for Rhogam? No, O+    Return to clinic 4 weeks    PORSHA Batista, CNM

## 2020-04-02 NOTE — RESULT ENCOUNTER NOTE
I spoke with the Patient and communicated these results. Rx for ferrous gluconate sent to pt's preferred pharmacy.     PORSHA Batista, YOLAM

## 2020-04-02 NOTE — RESULT ENCOUNTER NOTE
Results discussed with patient in clinic. Mild pyelectasis was noted on previous US, plan to reassess between 32-34 weeks.     PORSHA Batista, CNM

## 2020-04-27 PROBLEM — O09.93 HIGH-RISK PREGNANCY IN THIRD TRIMESTER: Status: ACTIVE | Noted: 2019-11-27

## 2020-04-27 NOTE — PATIENT INSTRUCTIONS
"PREECLAMPSIA SIGNS AND SYMPTOMS    Preeclampsia is a dangerous condition that some women develop in the second half of pregnancy. It can also begin after the baby is born.  Preeclampsia causes high blood pressure and can cause problems with many organ systems in your body.  It can also affect the growth of your baby. The exact cause of preeclampsia is unknown, however, there are signs and symptoms to watch for:    -A bad headache that doesn't improve with Tylenol  -Visual changes such as spots, flashes of light, blurry vision  -Pain in the upper right part of your abdomen, especially under the ribs that doesn't go away  -Nausea and/or vomiting  -Feeling extremely tired  -Yellowing of the skin and/or eyes  -Feeling \"not quite right\" or that something is wrong  -An extreme amount of swelling (some swelling in pregnancy is very normal)    If your midwife feels that you are developing preeclampsia, you will have lab tests drawn and will be monitored very closely.     If you are experiencing anyof these symptoms, call the [facility] immediately at [phone].  SIGNS OF  LABOR    Labor is  if it happens more than three weeks before your due date.    It can be hard to know if you are in labor, since the symptoms can be like the normal feelings of pregnancy.  Often, the only difference is the symptoms increase or they don't go away.     Signs of  labor can include:      Contractions which can feel like period cramps or gas pain.  You may feel it in the lower part of your abdomen, in your back, or as a pressure feeling in your bottom.  It is often regular, coming every 5 or 10 minutes, and  lasting about 30-60 seconds. Some contractions are normal during pregnancy (Armin jones contractions) but if you are feeling more than 5-6 in one hour that is NOT normal    If this occurs empty your bladder, then drink 2-3 glasses of water, eat a snack, and lay down on your left side. Put " your hand on your abdomen to count the contractions.  If after one hour of resting you have still had 5-6 contractions call your clinic right away.      If you feel a pop, gush, or trickle of fluid it may mean that your bag of water has broken and you should contact the clinic       You may also experience loose stools and/or rectal pressure       Listen to your body, if something doesn't seem right please call us at the clinic    Risk Factors      Previous  delivery    Bacterial Vaginosis- if you notice a fishy smell to your discharge or experience vaginal itching/discomfort you should be evaluated for infection    Smoking    Drug abuse    Adolescent (teen) pregnancy or advanced maternal age (AMA) age 35 and over    Dehydration (this may not cause  labor but it can cause contractions)    If you think you are in  labor we may do some lab testing in the clinic or send you to the hospital for evaluation    Please call us if you are concerned you are in  labor.    [facility]  [phone]    Over-the-counter (OTC) medications during pregnancy    Make sure to follow package directions for dosing and information unless otherwise noted on the list.    Morning sickness/nausea:      Unisom (doxylamine) 12.5 mg (1/2 tab) and Vitamin B6 25-50 mg three times daily. Unisom may cause some drowsiness as it is typically used for sleep. The combination of these medications can be very effective but they can also be taken separately    Dramamine (Dimenhydrinate) 25-50 mg every 4-6 hours as needed    Benadryl (diphenhydramine) 25-50 mg every 4-6 hours     Ginger tablets 1000 mg per day in divided doses    Constipation:      Colace (Docusate sodium)    Metamucil    Citrucel    Milk of magnesia    Fibercon    Miralax (if all other methods have failed)    Diarrhea:    Imodium (loperamide)    Heartburn:      Zantac (ranitidine)    Pepcid (famotidine)    Prilosec  (omeprazole)    Antacids-Tums, Maalox (liquid or tablets), Rolaids  Pepto Bismol and Lanie Gray are NOT RECOMMENDED for use during pregnancy because they contain aspirin    Hemorrhoids:      Tucks pads/ointment    Anusol/Anusol-HC    Preparation H    Gas Pain  Simethicone (Gas-X, Mylanta Gas, Mylicon)      Cough, cold, and congestion:      Robitussin (dextromethorphan) and Robitussin DM (dextromethorphan and guaifenesin)    Cough drops/zinc lozenges    Mucinex    Sudafed (after 16 weeks gestation)    Vicks Vapo rub  Cough medicine with alcohol like Nyquil is not recommended during pregnancy    Headache:      Acetaminophen (Tylenol) 650 mg every 4-6 hours or 1000 mg every 6 hours, do not exceed 4000 mg in 24 hours   Ibuprofen (Advil/Motrin) and Naproxen (Aleve) are not recommended during the 1st or 3rd trimester of pregnancy    Allergy:      Benadryl (diphenhydramine)    Zyrtec (cetirizine)    Claritin (loratadine)    Rash/Itching:      Benadryl lotion    Cortaid cream (Hydrocortisone cream)    Benadryl (diphenhydramine)    Vaginal yeast infection:      Monistat 3 or 7 day    Gyne-Lotrimin    Acne:      Benzoyl Peroxide    Salicylic acid     If you have questions or concerns about any medications that are available OTC or you are unsure if something is safe call:    [facility]  [phone]      Vaginitis (Vaginal Irritation/Infection)    Vaginitis is very common!  The most common vaginal infections are bacterial vaginosis or yeast. These infections are not sexually transmitted but can be incredibly uncomfortable. Seek care from your midwife if signs or symptoms arise.     Normal vaginal discharge:      Is white, clear, thick or thin (it may change depending on where you are in your cycle)    Does not have a foul odor    The amount of discharge varies    Abnormal discharge/symptoms:       Itching in and around the vagina    Redness, pain or swelling    Discharge that is foamy, greenish,  curd like, or bloody    Foul smelling odor    Pain when urinating or having sex    Fever    Causes of vaginal infections:      Good bacteria from the vagina have been destroyed by bad bacteria    Reaction to something in the vagina such as a tampon or scented/perfumed soaps or bubble bath    STI's    Sensitivities to soaps/detergents/dryer sheets, lubricants, etc.    Hormonal changes    Recent use of antibiotics     Infections can also occur after you've had intercourse with a new partner or if you have had frequent intercourse         Here is a list of suggestions that may help prevent/treat vaginal infections and will help maintain a healthy vaginal environment:      1.  Boosting your immune system so you can heal faster      Make sure you are getting adequate sleep    Drink 2-3 quarts of fluids per day, Cranberries or cranberry juice (unsweetened)    Eat more nuts, grains, raw veggies, yogurt, marisa, grapefruit    Decrease intake of refined sugar, red meat and alcohol    Echinacea - 3 times a day for chronic problem or every 2 hours for acute symptoms; use as directed on bottle          2.  Changing the vaginal environment to a more acid state       Soak in a warm bath tub with one cup of vinegar or lemon juice. Do not use scented soap, bubble bath, or oils.       3.  Increasing the good healthy bacteria      At each meal drink 1 tsp apple cider vinegar and 1 tsp honey in   cup warm water    Eat garlic daily, capsules or fresh.      Take probiotics 4-8 billion units/day      4.  Preventive measures      Wear cotton underwear, no thongs.  Do not wear tight clothes or pantyhose    Shower soon after working or change out of sweaty clothing     Do not wear underwear to bed.  The vaginal environment needs to breathe    Never douche or use vaginal , the vagina is self-cleaning!    Use white, unscented toilet paper.  Do not use baby wipes.  Wipe from front to back    Use only unscented tampons and pads, buy  organic products if desired    Do not use perfumes/oils/lotions near your vagina or take bubble baths    Use only mild, unscented soaps around your vaginal area     Do not use fabric softeners/dryer sheets    Use gentle, unscented detergent, consider buying non-petroleum based detergents    Use only water based lubricants during sexual contact    Abstain from intercourse during times of infection      If your symptoms do not resolve or if you have questions please call:     [facility]   [phone]

## 2020-04-27 NOTE — PROGRESS NOTES
States that she has noticed increased wetness on her underwear over the past 2 days.  She denies feeling like fluid is coming down her legs, but is concerned about the increased wetness.  She also states that she has noticed an odor to her discharge/wetness, and has had some stress incontinence with sneezing/laughing.    Carolyn also states that she has not been able to sleep over the past 1-2 weeks and is feeling very tired.  She has not tried any OTC medications for insomnia.  She has also noticed an increase in nausea/heartburn 1-3 x per week that resolves with Pepcid.   Fetal movement: positive, denies vb/contractions  Tdap given: Yes  Rhogam: NA; O+  SSE: cervix long, closed, no pooling.  White discharge and mucus observed in vaginal canal.  Wet prep collected, +BV.   UA/UC in process  Reviewed PTL precautions and S&S of PIH, patient verbalizes understanding and what to report  Reviewed vaginitis medication use, prevention, call with any continued or worsening signs and symptoms   Hospital Registration reminder-online  Return to clinic 2 weeks US and in person visit    Trudy STORY CNM

## 2020-04-28 PROBLEM — M54.9 BACK PAIN DURING PREGNANCY: Status: RESOLVED | Noted: 2020-01-27 | Resolved: 2020-04-28

## 2020-04-28 PROBLEM — O99.891 BACK PAIN DURING PREGNANCY: Status: RESOLVED | Noted: 2020-01-27 | Resolved: 2020-04-28

## 2020-04-28 NOTE — PROGRESS NOTES
Discharge Note    Progress reporting period is from initial evaluation date (please see noted date below) to Mar 2, 2020.  Linked Episodes   Type: Episode: Status: Noted: Resolved: Last update: Updated by:   PHYSICAL THERAPY back pain during pregnancy Active 1/27/2020  3/2/2020  9:22 AM Linnette Trejo, PT      Comments:       Carolyn failed to follow up and current status is unknown.  Please see information below for last relevant information on current status.  Patient seen for 3 visits.    SUBJECTIVE  Subjective changes noted by patient:  The pt reports that she continues to have the low back down the right side of her glutes. She continues to have upper shoulder pain. If she is walkings too long she will have tingling in her right glutes.  .  Current pain level is  .     Previous pain level was  5/10.   Changes in function:  Yes (See Goal flowsheet attached for changes in current functional level)  Adverse reaction to treatment or activity: None    OBJECTIVE  Changes noted in objective findings: posture: L ASIS superior, ASIS level after mobilizaitons. Reduced SIJ pain with SIJ belt.     ASSESSMENT/PLAN  Diagnosis: pelvic girdle pain in pregnancy   Updated problem list and treatment plan:   Pain - HEP  Decreased ROM/flexibility - HEP  Decreased function - HEP  Decreased strength - HEP  Impaired muscle performance - HEP  Impaired posture - HEP  STG/LTGs have been met or progress has been made towards goals:  Yes, please see goal flowsheet for most current information  Assessment of Progress: current status is unknown.    Last current status: Pt is progressing as expected   Self Management Plans:  HEP  I have re-evaluated this patient and find that the nature, scope, duration and intensity of the therapy is appropriate for the medical condition of the patient.  Carolyn continues to require the following intervention to meet STG and LTG's:  HEP.    Recommendations:  Discharge with current home program.   Patient to follow up with MD as needed.    Please refer to the daily flowsheet for treatment today, total treatment time and time spent performing 1:1 timed codes.

## 2020-04-30 ENCOUNTER — PRENATAL OFFICE VISIT (OUTPATIENT)
Dept: MIDWIFE SERVICES | Facility: CLINIC | Age: 20
End: 2020-04-30
Payer: COMMERCIAL

## 2020-04-30 VITALS — WEIGHT: 164 LBS | BODY MASS INDEX: 29.05 KG/M2 | SYSTOLIC BLOOD PRESSURE: 102 MMHG | DIASTOLIC BLOOD PRESSURE: 56 MMHG

## 2020-04-30 DIAGNOSIS — N76.0 BACTERIAL VAGINOSIS: ICD-10-CM

## 2020-04-30 DIAGNOSIS — O09.93 HIGH-RISK PREGNANCY IN THIRD TRIMESTER: Primary | ICD-10-CM

## 2020-04-30 DIAGNOSIS — R35.0 URINARY FREQUENCY: ICD-10-CM

## 2020-04-30 DIAGNOSIS — Z23 NEED FOR TDAP VACCINATION: ICD-10-CM

## 2020-04-30 DIAGNOSIS — R93.89 ABNORMAL ULTRASOUND: ICD-10-CM

## 2020-04-30 DIAGNOSIS — Z3A.28 28 WEEKS GESTATION OF PREGNANCY: ICD-10-CM

## 2020-04-30 DIAGNOSIS — O99.519 ASTHMA COMPLICATING PREGNANCY, ANTEPARTUM: ICD-10-CM

## 2020-04-30 DIAGNOSIS — J45.909 ASTHMA COMPLICATING PREGNANCY, ANTEPARTUM: ICD-10-CM

## 2020-04-30 DIAGNOSIS — B96.89 BACTERIAL VAGINOSIS: ICD-10-CM

## 2020-04-30 DIAGNOSIS — N89.8 VAGINAL DISCHARGE: ICD-10-CM

## 2020-04-30 DIAGNOSIS — G47.00 INSOMNIA, UNSPECIFIED TYPE: ICD-10-CM

## 2020-04-30 LAB
ALBUMIN UR-MCNC: NEGATIVE MG/DL
APPEARANCE UR: CLEAR
BILIRUB UR QL STRIP: NEGATIVE
COLOR UR AUTO: YELLOW
GLUCOSE UR STRIP-MCNC: NEGATIVE MG/DL
HGB UR QL STRIP: NEGATIVE
KETONES UR STRIP-MCNC: NEGATIVE MG/DL
LEUKOCYTE ESTERASE UR QL STRIP: NEGATIVE
NITRATE UR QL: NEGATIVE
NON-SQ EPI CELLS #/AREA URNS LPF: NORMAL /LPF
PH UR STRIP: 7 PH (ref 5–7)
RBC #/AREA URNS AUTO: NORMAL /HPF
SOURCE: NORMAL
SP GR UR STRIP: 1.02 (ref 1–1.03)
SPECIMEN SOURCE: ABNORMAL
UROBILINOGEN UR STRIP-ACNC: 0.2 EU/DL (ref 0.2–1)
WBC #/AREA URNS AUTO: NORMAL /HPF
WET PREP SPEC: ABNORMAL

## 2020-04-30 PROCEDURE — 90715 TDAP VACCINE 7 YRS/> IM: CPT | Performed by: NURSE PRACTITIONER

## 2020-04-30 PROCEDURE — 87210 SMEAR WET MOUNT SALINE/INK: CPT | Performed by: NURSE PRACTITIONER

## 2020-04-30 PROCEDURE — 81001 URINALYSIS AUTO W/SCOPE: CPT | Performed by: NURSE PRACTITIONER

## 2020-04-30 PROCEDURE — 87086 URINE CULTURE/COLONY COUNT: CPT | Performed by: NURSE PRACTITIONER

## 2020-04-30 PROCEDURE — 99207 ZZC PRENATAL VISIT: CPT | Performed by: NURSE PRACTITIONER

## 2020-04-30 PROCEDURE — 90471 IMMUNIZATION ADMIN: CPT | Performed by: NURSE PRACTITIONER

## 2020-04-30 PROCEDURE — 99213 OFFICE O/P EST LOW 20 MIN: CPT | Mod: 25 | Performed by: NURSE PRACTITIONER

## 2020-04-30 RX ORDER — METRONIDAZOLE 7.5 MG/G
1 GEL VAGINAL AT BEDTIME
Qty: 70 G | Refills: 0 | Status: SHIPPED | OUTPATIENT
Start: 2020-04-30 | End: 2020-05-12

## 2020-04-30 NOTE — RESULT ENCOUNTER NOTE
Results discussed directly with patient while patient was present. Any further details documented in the note.   PORSHA Mccarthy CNM

## 2020-05-01 LAB
BACTERIA SPEC CULT: NORMAL
Lab: NORMAL
SPECIMEN SOURCE: NORMAL

## 2020-05-08 NOTE — PROGRESS NOTES
Feels well, happy with US pics! Has some questions today about nausea coming back, especially in the morning, some heartburn as as well, using pepcid. Rpeorts increasing low back pain with activity.  Discussed small frequent meals, recommned trying patricia, may also try tums. Recommend trying heat/ice/baths, may try pregnancy support belt as well.  F/U US for pyelectasis- 3 mm bilaterally, MVP WNL  Will send final US report to patient when availanle  Fetal Movement: positive, denies loss of fluid/vb, no contractions  Fetal kick counts reviewed and handout given  Reviewed PTL precautions and s/sx of preeclampsia; denies any S&S and aware of what to report  Repeat CBC at 32 weeks, taking iron   Return to clinic in 2 weeks    PORSHA Saldaña, SALLY

## 2020-05-12 ENCOUNTER — ANCILLARY PROCEDURE (OUTPATIENT)
Dept: ULTRASOUND IMAGING | Facility: CLINIC | Age: 20
End: 2020-05-12
Payer: COMMERCIAL

## 2020-05-12 ENCOUNTER — PRENATAL OFFICE VISIT (OUTPATIENT)
Dept: MIDWIFE SERVICES | Facility: CLINIC | Age: 20
End: 2020-05-12
Payer: COMMERCIAL

## 2020-05-12 VITALS — DIASTOLIC BLOOD PRESSURE: 64 MMHG | SYSTOLIC BLOOD PRESSURE: 122 MMHG | WEIGHT: 166 LBS | BODY MASS INDEX: 29.41 KG/M2

## 2020-05-12 DIAGNOSIS — Z3A.29 29 WEEKS GESTATION OF PREGNANCY: ICD-10-CM

## 2020-05-12 DIAGNOSIS — O09.93 HIGH-RISK PREGNANCY IN THIRD TRIMESTER: ICD-10-CM

## 2020-05-12 DIAGNOSIS — J45.909 ASTHMA COMPLICATING PREGNANCY, ANTEPARTUM: ICD-10-CM

## 2020-05-12 DIAGNOSIS — O99.013 ANEMIA AFFECTING PREGNANCY IN THIRD TRIMESTER: ICD-10-CM

## 2020-05-12 DIAGNOSIS — O99.519 ASTHMA COMPLICATING PREGNANCY, ANTEPARTUM: ICD-10-CM

## 2020-05-12 DIAGNOSIS — R93.89 ABNORMAL ULTRASOUND: ICD-10-CM

## 2020-05-12 DIAGNOSIS — O09.93 HIGH-RISK PREGNANCY IN THIRD TRIMESTER: Primary | ICD-10-CM

## 2020-05-12 PROCEDURE — 99207 ZZC PRENATAL VISIT: CPT | Performed by: ADVANCED PRACTICE MIDWIFE

## 2020-05-12 PROCEDURE — 76816 OB US FOLLOW-UP PER FETUS: CPT | Performed by: OBSTETRICS & GYNECOLOGY

## 2020-05-12 NOTE — RESULT ENCOUNTER NOTE
I left a voicemail for Carolyn and informed her of results. Also reviewed during prenatal visit today.    PORSHA Chavez CNM

## 2020-05-26 ENCOUNTER — NURSE TRIAGE (OUTPATIENT)
Dept: NURSING | Facility: CLINIC | Age: 20
End: 2020-05-26

## 2020-05-26 ENCOUNTER — PRENATAL OFFICE VISIT (OUTPATIENT)
Dept: MIDWIFE SERVICES | Facility: CLINIC | Age: 20
End: 2020-05-26
Payer: COMMERCIAL

## 2020-05-26 ENCOUNTER — HOSPITAL ENCOUNTER (OUTPATIENT)
Facility: CLINIC | Age: 20
End: 2020-05-26
Admitting: ADVANCED PRACTICE MIDWIFE
Payer: COMMERCIAL

## 2020-05-26 ENCOUNTER — HOSPITAL ENCOUNTER (OUTPATIENT)
Facility: CLINIC | Age: 20
Discharge: HOME | End: 2020-05-26
Attending: ADVANCED PRACTICE MIDWIFE | Admitting: ADVANCED PRACTICE MIDWIFE
Payer: COMMERCIAL

## 2020-05-26 VITALS — TEMPERATURE: 98.2 F | SYSTOLIC BLOOD PRESSURE: 124 MMHG | RESPIRATION RATE: 16 BRPM | DIASTOLIC BLOOD PRESSURE: 70 MMHG

## 2020-05-26 VITALS — BODY MASS INDEX: 29.05 KG/M2 | SYSTOLIC BLOOD PRESSURE: 102 MMHG | WEIGHT: 164 LBS | DIASTOLIC BLOOD PRESSURE: 62 MMHG

## 2020-05-26 DIAGNOSIS — J45.909 ASTHMA COMPLICATING PREGNANCY, ANTEPARTUM: ICD-10-CM

## 2020-05-26 DIAGNOSIS — O09.93 HIGH-RISK PREGNANCY IN THIRD TRIMESTER: Primary | ICD-10-CM

## 2020-05-26 DIAGNOSIS — O99.013 ANEMIA DURING PREGNANCY IN THIRD TRIMESTER: ICD-10-CM

## 2020-05-26 DIAGNOSIS — Z3A.31 31 WEEKS GESTATION OF PREGNANCY: ICD-10-CM

## 2020-05-26 DIAGNOSIS — O99.519 ASTHMA COMPLICATING PREGNANCY, ANTEPARTUM: ICD-10-CM

## 2020-05-26 LAB
ERYTHROCYTE [DISTWIDTH] IN BLOOD BY AUTOMATED COUNT: 13.2 % (ref 10–15)
HCT VFR BLD AUTO: 31.9 % (ref 35–47)
HGB BLD-MCNC: 10.6 G/DL (ref 11.7–15.7)
MCH RBC QN AUTO: 28.6 PG (ref 26.5–33)
MCHC RBC AUTO-ENTMCNC: 33.2 G/DL (ref 31.5–36.5)
MCV RBC AUTO: 86 FL (ref 78–100)
PLATELET # BLD AUTO: 197 10E9/L (ref 150–450)
RBC # BLD AUTO: 3.7 10E12/L (ref 3.8–5.2)
WBC # BLD AUTO: 8 10E9/L (ref 4–11)

## 2020-05-26 PROCEDURE — 40000809 ZZH STATISTIC NO DOCUMENTATION TO SUPPORT CHARGE

## 2020-05-26 PROCEDURE — 59025 FETAL NON-STRESS TEST: CPT

## 2020-05-26 PROCEDURE — G0463 HOSPITAL OUTPT CLINIC VISIT: HCPCS

## 2020-05-26 PROCEDURE — 85027 COMPLETE CBC AUTOMATED: CPT | Performed by: ADVANCED PRACTICE MIDWIFE

## 2020-05-26 PROCEDURE — 99207 ZZC PRENATAL VISIT: CPT | Performed by: ADVANCED PRACTICE MIDWIFE

## 2020-05-26 PROCEDURE — G0463 HOSPITAL OUTPT CLINIC VISIT: HCPCS | Mod: 25

## 2020-05-26 PROCEDURE — 99213 OFFICE O/P EST LOW 20 MIN: CPT | Performed by: ADVANCED PRACTICE MIDWIFE

## 2020-05-26 PROCEDURE — 36415 COLL VENOUS BLD VENIPUNCTURE: CPT | Performed by: ADVANCED PRACTICE MIDWIFE

## 2020-05-26 NOTE — PATIENT INSTRUCTIONS
SIGNS OF  LABOR    Labor is  if it happens more than three weeks before your due date.    It can be hard to know if you are in labor, since the symptoms can be like the normal feelings of pregnancy.  Often, the only difference is the symptoms increase or they don't go away.     Signs of  labor can include:      Contractions which can feel like period cramps or gas pain.  You may feel it in the lower part of your abdomen, in your back, or as a pressure feeling in your bottom.  It is often regular, coming every 5 or 10 minutes, and  lasting about 30-60 seconds. Some contractions are normal during pregnancy (Story jones contractions) but if you are feeling more than 5-6 in one hour that is NOT normal    If this occurs empty your bladder, then drink 2-3 glasses of water, eat a snack, and lay down on your left side. Put your hand on your abdomen to count the contractions.  If after one hour of resting you have still had 5-6 contractions call your clinic right away.      If you feel a pop, gush, or trickle of fluid it may mean that your bag of water has broken and you should contact the clinic       You may also experience loose stools and/or rectal pressure       Listen to your body, if something doesn't seem right please call us at the clinic    Risk Factors      Previous  delivery    Bacterial Vaginosis- if you notice a fishy smell to your discharge or experience vaginal itching/discomfort you should be evaluated for infection    Smoking    Drug abuse    Adolescent (teen) pregnancy or advanced maternal age (AMA) age 35 and over    Dehydration (this may not cause  labor but it can cause contractions)    If you think you are in  labor we may do some lab testing in the clinic or send you to the hospital for evaluation    Please call us if you are concerned you are in  labor.    [facility]  [phone]    PREECLAMPSIA SIGNS AND SYMPTOMS    Preeclampsia is a  "dangerous condition that some women develop in the second half of pregnancy. It can also begin after the baby is born.  Preeclampsia causes high blood pressure and can cause problems with many organ systems in your body.  It can also affect the growth of your baby. The exact cause of preeclampsia is unknown, however, there are signs and symptoms to watch for:    -A bad headache that doesn't improve with Tylenol  -Visual changes such as spots, flashes of light, blurry vision  -Pain in the upper right part of your abdomen, especially under the ribs that doesn't go away  -Nausea and/or vomiting  -Feeling extremely tired  -Yellowing of the skin and/or eyes  -Feeling \"not quite right\" or that something is wrong  -An extreme amount of swelling (some swelling in pregnancy is very normal)    If your midwife feels that you are developing preeclampsia, you will have lab tests drawn and will be monitored very closely.     If you are experiencing anyof these symptoms, call the [facility] immediately at [phone].    Fetal Kick Counts    It is important to know when your baby's movements occur. We often get busy with work and life and do not pay close attention to their movements.        Women typically begin feeling movement between 18-22 weeks of gestation, sometimes it can be earlier or later depending on where your placenta is       Movements usually begin feeling like popping or fluttering and as the baby grows they become more pronounced    Toward the end of pregnancy as the baby gets larger they may not move as much or make as big of movements. Babies have maturing sleep cycles as well as not as much room to move and flip. If you are ever concerned about your baby's movements or have not felt the baby move for a while, we recommend you do a fetal kick count. Prior to starting your count drink a glass of water or juice and eat a snack. Then lay down on your side and begin to count movements.     How to do " a Fetal Kick Counts    There are many different ways to monitor your baby's movements. Movements can range from large jabs to small kicks, or wiggles.  Hiccups count!      Count 10 movements in 2 hours when resting and focusing    Count 10 movements in 12 hours when doing normal activity    We recommend that if movements occur but seem decreased that you should be seen in the clinic or hospital for evaluation within 12 hours. If fetal movement is absent or fetal kick counts are low please contact us right away.    If you ever have any concerns about your baby's movements DO NOT HESITATE to call us, we are here for you!    [facility]  [phone]

## 2020-05-26 NOTE — PROGRESS NOTES
Feels well overall.   Fetal Movement: positive, denies loss of fluid/vb, no contractions  Fetal kick counts/movement reviewed  Reviewed PTL precautions and s/sx of preeclampsia; denies any S&S and aware of what to report    Hgb drawn due to anemia in pregnancy. Currently taking iron supplements.   Iincreased anxiety and crying in the past few days. We discussed the normalcy of mood swings in pregnancy. She feels safe. Offered a mental health referral. She declined at this time, but will contact us if she wants it. Discussed if s/sx increase or do not improve, then we can look into medications.     Peds chosen: not yet. She will start to look into it.  Discussed childbirth classes. She is worried that boyfriend will not know what to do in labor.     Plans to breastfeed Yes - gave information on [employer] to check out breast pumps.     Return to clinic 2 weeks    Mandy Deutsch, SLAVA, APRN, CNM

## 2020-05-26 NOTE — TELEPHONE ENCOUNTER
Patient called reporting that she was in for a visit today and after exam, including pressing on her abdomen, reports that if she touches her abdomen in the areas that were pressed on, it really hurts and she is not able to tolerate it.     Reports that baby is not moving as much today as she was this morning.  States that the baby has only moved/kicked 2-3 times since the visit.     Called [facility] talked to ANAMARIA Barnes.  Reports that patient can come to door # 6 and let them know that she is there for the maternal assessment center.     Attempted to call patient at number confirmed at beginning of the visit and each time goes directly to voicemail.  Will attempt again in 15 minutes.     Spoke with patient and provided information above. Patient reports she will be leaving soon.     Paola Pablo RN/[facility]    COVID 19 Nurse Triage Plan/Patient Instructions    Please be aware that novel coronavirus (COVID-19) may be circulating in the community. If you develop symptoms such as fever, cough, or SOB or if you have concerns about the presence of another infection including coronavirus (COVID-19), please contact your health care provider or visit [email or web address].     Disposition/Instructions    Patient to go to L&D and follow protocol based instructions. Follow System Ambulatory Workflow for COVID 19.     Bring Your Own Device:  Please also bring your smart device(s) (smart phones, tablets, laptops) and their charging cables for your personal use and to communicate with your care team during your visit.    Thank you for limiting contact with others, wearing a simple mask to cover your cough, practice good hand hygiene habits and accessing our virtual services where possible to limit the spread of this virus.    For more information about COVID19 and options for caring for yourself at home, please visit the CDC website at https://www.cdc.gov/coronavirus/2019-ncov/about/steps-when-sick.html  For  more options for care at [facility], please visit our website at [email or web address]    For more information, please use the [place] Department of Health COVID-19 Website: https://www.health.[facility].[place]/diseases/coronavirus/index.html  [place] Department of Health ([facility]) COVID-19 Hotlines (Interpreters available):      Health questions: Phone Number: [phone] or 1-761.924.8833 and Hours: 7 a.m. to 7 p.m.    Schools and  questions: Phone Number: [phone] or [phone] and Hours 7 a.m. to 7 p.m.    Reason for Disposition    [1] Pregnant 23 or more weeks AND [2] no fetal movements or decreased fetal movements (e.g., kick count < 5 in 1 hour or < 10 in 2 hours)    Additional Information    Negative: Major injury from dangerous force or speed (e.g., MVA, fall > 10 feet or 3 meters)    Negative: Bullet or knife wound    Negative: Puncture wound that sounds life-threatening to the triager    Negative: [1] Major bleeding (e.g., actively dripping or spurting) AND [2] can't be stopped    Negative: Shock suspected (e.g., cold/pale/clammy skin, too weak to stand, low BP, rapid pulse)    Negative: Deep wound of abdomen (e.g., can see intestines)    Negative: Difficulty breathing    Negative: SEVERE abdominal pain    Negative: [1] Vaginal bleeding AND [2] pregnant > 20 weeks    Negative: Sounds like a life-threatening emergency to the triager    Negative: [1] Abdominal pain not from an injury AND [2] pregnant < 20 weeks    Negative: [1] Abdominal pain not from an injury AND [2] pregnant > 20 weeks    Negative: Wound looks infected    Negative: [1] Vaginal bleeding AND [2] pregnant < 20 weeks (Exception: single episode of spotting)    Negative: Blood in urine (red, pink, or tea-colored)    Negative: Blood in vomit or from rectum    Negative: [1] Vomiting AND [2] 2 or more times    Negative: Shoulder pain    Negative: Skin is split open or gaping (or length > 1/2 inch or  12 mm)    Negative: [1] Bleeding AND [2] won't stop after 10 minutes of direct pressure (using correct technique)    Negative: [1] Dirt in the wound AND [2] not removed with 15 minutes of scrubbing    Negative: [1] Abdominal pain (not severe) AND [2] present > 1 hour    Negative: Sounds like a serious injury to the triager    Negative: [1] Pregnant 20 or more weeks AND [2] motor vehicle accident    Negative: [1] Pregnant 20 or more weeks AND [2] fall to ground or floor (e.g., walking and tripped)    Negative: [1] Pregnant 20 or more weeks AND [2] direct blow to abdomen (e.g., punched, kicked, struck with object)    Negative: [1] Pregnant 20 or more weeks AND [2] contractions    Protocols used: PREGNANCY - ABDOMEN INJURY-A-AH

## 2020-05-26 NOTE — RESULT ENCOUNTER NOTE
Ariane Leon,    Your hemoglobin has increased! We still want you to continue with the iron supplementation. If you have any questions or concerns, please contact us.    Thanks,  Mandy Deutsch, SLAVA, APRN, CNM

## 2020-05-27 NOTE — PLAN OF CARE
Patient arrived with c/o decreased fetal movement and abdominal pain and tenderness.  Patient placed on fetal monitor and fetal HR found right away with moderate variability and accelerations.  Nila Negron CNM called to bedside for assessment of abdominal pain and tenderness.  NST completed, additional observation time completed while patient hydrated.  Patient discharged home with instructions per CNM to conduct lay down and perform kick count whenever concerned for fetal movement and then call FNA or come to the Maternal Assessment Center with any further concerns.  Patient ambulated to the exit.

## 2020-05-27 NOTE — DISCHARGE INSTRUCTIONS
Discharge Instruction for Undelivered Patients      You were seen for: Fetal Assessment  We Consulted: Nila Negron CNM  You had (Test or Medicine): Fetal Non-Stress Test     Diet:   Drink 8 to 12 glasses of liquids (milk, juice, water) every day.     Activity:  Call your doctor or nurse midwife if your baby is moving less than usual.     Call your provider if you notice:  Swelling in your face or increased swelling in your hands or legs.  Headaches that are not relieved by Tylenol (acetaminophen).  Changes in your vision (blurring: seeing spots or stars.)  Nausea (sick to your stomach) and vomiting (throwing up).   Weight gain of 5 pounds or more per week.  Heartburn that doesn't go away.  Signs of bladder infection: pain when you urinate (use the toilet), need to go more often and more urgently.  The bag of marx (rupture of membranes) breaks, or you notice leaking in your underwear.  Bright red blood in your underwear.  Abdominal (lower belly) or stomach pain.  For first baby: Contractions (tightening) less than 5 minutes apart for one hour or more.  *If less than 34 weeks: Contractions (tightenings) more than 6 times in one hour.  Increase or change in vaginal discharge (note the color and amount)    Follow-up:  As scheduled in the clinic

## 2020-05-27 NOTE — PROGRESS NOTES
MATERNAL ASSESSMENT CENTER CNM TRIAGE NOTE    Carolyn Cerda is a 20 year old  with and IUP at 31w6d who presents with complaint of abdominal pressure/pain and decreased fetal movement.    Carolyn was seen in clinic today and difficult time getting heart tones, felt like ever since CNM did heart tones and had to move baby she had pain. Pain in ULQ, URQ, and RLQ, rates in 4/10, no changes all day, does not come and go, reports it feels like tender to touch and generalized discomfort.    If everything is normal wondering how she will know when things are abnormal or when its time for labor. Patient reports anxiety but feels better after being seen and knowing baby is okay.    Denies ROM   Denies vaginal bleeding  Denies urinary/vaginitis symptoms    [facility]    Patient Active Problem List   Diagnosis     High-risk pregnancy in third trimester     Asthma complicating pregnancy, antepartum     Cyst of left ovary     Fetal pyelectasis on anatomy ultrasound         ROS:  Patient is alert and oriented  Patient is very anxious    PHYSICAL EXAM:  /70   Temp 98.2  F (36.8  C) (Temporal)   Resp 16   LMP  (LMP Unknown)     FHT's 135 with moderate variability  Accelerations: present   Decelerations:  absent        Contractions: Pt is guera in an irregular pattern; irritability  Occasional contractions, mild to palpation, patient unaware  Negative gall bladder pain  Negative rebound tenderness  + abdominal pain with moderate palpation from just above umbilicus to pubic bone more on right side   Abdomen: gravid  Bloody show: no  Cervix: closed/dimple/ 0/ Mid/ average/ -3  Membranes are intact       Verified vertex by bedside ultrasound, spine on patient right side    ASSESSMENT :   20 year old  with phillips IUP 31w6d for decreased fetal movement  Fetal heart tones appropriate for gestational age  CBC normal today in clinic    PLAN:  Discharge  home  F/U in 2 weeks for routine prenatal care or sooner if problems arise  Lengthy discussion about end of pregnancy discomforts versus abnormal pain, we reviewed patient had pain in all areas where baby was present per US  Discussed fetal kick counts, reviewed 10 movements in two hours  Encouraged good hydration and nutrition, patient has not eaten dinner yet, will eat after discharge  Recommend she try heat/ice, tylenol, bath, for comfort, may want to try pregnancy support belt for muscle discomforts, position changes  Encouraged to decrease anxiety, reach out for support as needed  Teaching done r/t to s/s of labor, SROM, decreased fetal movement, comfort measures in third trimester.  Instructed to please refer to the discharge handouts, the RN triage line or on-call CNM for any questions or concerns.  Pt verbalizes understanding and agreement with current plan of care.    20 minutes was spent face to face with the patient today discussing her history, diagnosis, and follow-up plan as noted above. Over 50% of the visit was spent in counseling and coordination of care.    Total Visit Time: 20 minutes.       PORSHA Chavez CNM

## 2020-06-08 NOTE — PROGRESS NOTES
"Feels ok. Having low back pain that is making it hard to sleep. Reviewed comfort measures for low back pain. Reviewed PTL warning signs and when to call.   [facility] iron [facility]  Birth preferences sheet given for review  Fetal Movement: positive, denies loss of fluid/vb, some BH contractions  Fetal kick counts/movement reviewed    Reviewed PTL precautions and s/sx of preeclampsia; denies any S&S and aware of what to report     Depression screening done: yes. Felt very emotional last couple of weeks but states \"this week I have felt fine\"   Taking hospital tour?  No, but has been to [facility] and is aware of where to go/who to call   Have car seat Yes  Discussed GBS/cx check at next visit  Return to clinic 2 weeks    PORSHA Batista, CNM          "

## 2020-06-08 NOTE — PATIENT INSTRUCTIONS
"PREECLAMPSIA SIGNS AND SYMPTOMS    Preeclampsia is a dangerous condition that some women develop in the second half of pregnancy. It can also begin after the baby is born.  Preeclampsia causes high blood pressure and can cause problems with many organ systems in your body.  It can also affect the growth of your baby. The exact cause of preeclampsia is unknown, however, there are signs and symptoms to watch for:    -A bad headache that doesn't improve with Tylenol  -Visual changes such as spots, flashes of light, blurry vision  -Pain in the upper right part of your abdomen, especially under the ribs that doesn't go away  -Nausea and/or vomiting  -Feeling extremely tired  -Yellowing of the skin and/or eyes  -Feeling \"not quite right\" or that something is wrong  -An extreme amount of swelling (some swelling in pregnancy is very normal)    If your midwife feels that you are developing preeclampsia, you will have lab tests drawn and will be monitored very closely.     If you are experiencing anyof these symptoms, call the [facility] immediately at [phone].    SIGNS OF  LABOR    Labor is  if it happens more than three weeks before your due date.    It can be hard to know if you are in labor, since the symptoms can be like the normal feelings of pregnancy.  Often, the only difference is the symptoms increase or they don't go away.     Signs of  labor can include:      Contractions which can feel like period cramps or gas pain.  You may feel it in the lower part of your abdomen, in your back, or as a pressure feeling in your bottom.  It is often regular, coming every 5 or 10 minutes, and  lasting about 30-60 seconds. Some contractions are normal during pregnancy (Tucker jones contractions) but if you are feeling more than 5-6 in one hour that is NOT normal    If this occurs empty your bladder, then drink 2-3 glasses of water, eat a snack, and lay down on your left side. Put " your hand on your abdomen to count the contractions.  If after one hour of resting you have still had 5-6 contractions call your clinic right away.      If you feel a pop, gush, or trickle of fluid it may mean that your bag of water has broken and you should contact the clinic       You may also experience loose stools and/or rectal pressure       Listen to your body, if something doesn't seem right please call us at the clinic    Risk Factors      Previous  delivery    Bacterial Vaginosis- if you notice a fishy smell to your discharge or experience vaginal itching/discomfort you should be evaluated for infection    Smoking    Drug abuse    Adolescent (teen) pregnancy or advanced maternal age (AMA) age 35 and over    Dehydration (this may not cause  labor but it can cause contractions)    If you think you are in  labor we may do some lab testing in the clinic or send you to the hospital for evaluation    Please call us if you are concerned you are in  labor.    [facility]  [phone]    Fetal Kick Counts    It is important to know when your baby's movements occur. We often get busy with work and life and do not pay close attention to their movements.        Women typically begin feeling movement between 18-22 weeks of gestation, sometimes it can be earlier or later depending on where your placenta is       Movements usually begin feeling like popping or fluttering and as the baby grows they become more pronounced    Toward the end of pregnancy as the baby gets larger they may not move as much or make as big of movements. Babies have maturing sleep cycles as well as not as much room to move and flip. If you are ever concerned about your baby's movements or have not felt the baby move for a while, we recommend you do a fetal kick count. Prior to starting your count drink a glass of water or juice and eat a snack. Then lay down on your side and begin to count  movements.     How to do a Fetal Kick Counts    There are many different ways to monitor your baby's movements. Movements can range from large jabs to small kicks, or wiggles.  Hiccups count!      Count 10 movements in 2 hours when resting and focusing    Count 10 movements in 12 hours when doing normal activity    We recommend that if movements occur but seem decreased that you should be seen in the clinic or hospital for evaluation within 12 hours. If fetal movement is absent or fetal kick counts are low please contact us right away.    If you ever have any concerns about your baby's movements DO NOT HESITATE to call us, we are here for you!    [facility] for [facility]  [phone]      GROUP B STREP    Group B Strep (GBS) is a common bacteria that is sometimes found in the vagina, urinary tract or rectum.  It is not harmful typically to adults but can cause serious illness in newborns.  It occasionally is passed from mother to baby during birth.   It is important to test in pregnancy.  When a woman is found to be positive for GBS, either at the first prenatal visit or by taking a culture at 36 weeks, treatment will be offered to reduce the chance of spreading the bacteria to the baby.       Treatment consists of either oral antibiotics early in pregnancy or antibiotics given by IV during labor if testing is positive at 36 weeks.      Even without treatment the baby rarely (1-2% of the time) gets infected.  With treatment the baby almost never gets infected.       There really isn't anything you can do to keep from getting or being positive for GBS.  It isn't sexually transmitted and there are no symptoms if you are positive.       Your midwife will discuss your results with you and make recommendations for treatment.                                                   Infant Car Seat Safety   [place] law requires that all infants must be secured into a car seat while in a car.    General Car Seat  Guidelines:            Your car seat should be no more than six years old and should never have been in an accident.  Always know the history of your car seat.            Car seats should be installed in the back seat and should be rear-facing. The American Academy of Pediatrics recommends that all infants and toddlers should remain rear-facing for as long as possible, until they reach the weight or height limits allowed by their car seat. Follow your car seat 's instructions.            The straps on the car seat should be snug enough that you should not be able to pinch up slack with your fingers.            Never dress your baby in thick clothing, coats or blankets while riding in a car seat.  Secure the baby into the seat wearing clothing of normal thickness and then cover baby s lap with a blanket OVER the car seat straps, if needed.  Car Seat Clinics and Safety Information:            Schedule an appointment to have a professional check the installation of your car seat before your baby is born.    Go to: https://[place]ps.[place].gov/divisions/ots/child-passenger-safety/Pages/car-seat-checks.aspx         and click on your county.            Sign up for email alerts on child restraint/car seat recalls at:     http://www-bola.nhtsa.dot.gov/subscriptions/index.cfm             Find more information on car seat safety at:     http://www.safercar.gov/parents/CarSeats/Car-Seat-Safety.htm

## 2020-06-09 ENCOUNTER — PRENATAL OFFICE VISIT (OUTPATIENT)
Dept: MIDWIFE SERVICES | Facility: CLINIC | Age: 20
End: 2020-06-09
Payer: COMMERCIAL

## 2020-06-09 VITALS — DIASTOLIC BLOOD PRESSURE: 54 MMHG | SYSTOLIC BLOOD PRESSURE: 110 MMHG | WEIGHT: 168.2 LBS | BODY MASS INDEX: 29.8 KG/M2

## 2020-06-09 DIAGNOSIS — R93.89 ABNORMAL ULTRASOUND: ICD-10-CM

## 2020-06-09 DIAGNOSIS — O09.93 HIGH-RISK PREGNANCY IN THIRD TRIMESTER: Primary | ICD-10-CM

## 2020-06-09 DIAGNOSIS — O99.012 ANEMIA DURING PREGNANCY IN SECOND TRIMESTER: ICD-10-CM

## 2020-06-09 DIAGNOSIS — Z3A.33 33 WEEKS GESTATION OF PREGNANCY: ICD-10-CM

## 2020-06-09 PROCEDURE — 99207 ZZC PRENATAL VISIT: CPT | Performed by: ADVANCED PRACTICE MIDWIFE

## 2020-06-09 RX ORDER — FERROUS GLUCONATE 324(38)MG
324 TABLET ORAL
Qty: 90 TABLET | Refills: 1 | Status: SHIPPED | OUTPATIENT
Start: 2020-06-09 | End: 2020-12-08

## 2020-06-22 NOTE — PROGRESS NOTES
"Feels well, increasing pelvic discomfort, aches/pain, some \"lightning vagina\", hoping she comes early.   Would like to do nitrous and use the tub if possible, also open to epidural if needed. Would like partner to help catch baby if possible, he helped deliver his first child and would like to again. We reviewed it will depend on who is on call and once baby head/shuoulders out, likely fine. Would like skin to skin for 1 hour, okay with meds, may delay vaccine and vitamin K, wishes to video tape and have partner cut the cord  Recommend discussing medications with pediatrician  Fetal movement: positive  Denies bleeding/lof, no contractions  GBS swab done today  Vaginal exam done, confirmed vertex via leopolds   Cx:  FT/40/-2, soft, mid, vertex  Labor instructions given  Labor preferences/birth plan reviewed-see above  Breast pump Rx-yes  Warning signs reviewed  Patient denies S&S of pre-eclampsia and aware of what to report   Return to clinic 1 week    PORSHA Saldaña, CNM    "

## 2020-06-24 ENCOUNTER — PRENATAL OFFICE VISIT (OUTPATIENT)
Dept: MIDWIFE SERVICES | Facility: CLINIC | Age: 20
End: 2020-06-24
Payer: COMMERCIAL

## 2020-06-24 VITALS — DIASTOLIC BLOOD PRESSURE: 58 MMHG | WEIGHT: 171.4 LBS | BODY MASS INDEX: 30.36 KG/M2 | SYSTOLIC BLOOD PRESSURE: 120 MMHG

## 2020-06-24 DIAGNOSIS — Z78.9 INFANT EXCLUSIVELY BREASTFED: ICD-10-CM

## 2020-06-24 DIAGNOSIS — J45.909 ASTHMA COMPLICATING PREGNANCY, ANTEPARTUM: ICD-10-CM

## 2020-06-24 DIAGNOSIS — Z36.85 ANTENATAL SCREENING FOR STREPTOCOCCUS B: ICD-10-CM

## 2020-06-24 DIAGNOSIS — O09.93 HIGH-RISK PREGNANCY IN THIRD TRIMESTER: Primary | ICD-10-CM

## 2020-06-24 DIAGNOSIS — Z3A.36 36 WEEKS GESTATION OF PREGNANCY: ICD-10-CM

## 2020-06-24 DIAGNOSIS — O99.519 ASTHMA COMPLICATING PREGNANCY, ANTEPARTUM: ICD-10-CM

## 2020-06-24 PROCEDURE — 87653 STREP B DNA AMP PROBE: CPT | Performed by: ADVANCED PRACTICE MIDWIFE

## 2020-06-24 PROCEDURE — 99207 ZZC PRENATAL VISIT: CPT | Performed by: ADVANCED PRACTICE MIDWIFE

## 2020-06-24 NOTE — PATIENT INSTRUCTIONS
"Labor Instructions for Midwife Patients    When to call:  Both during and after office hours call  [phone]. There is a triage RN to take your calls and answer your questions 24 hours a day.  If they cannot answer your question they will page the midwife on call for you.    When to call:  Call anytime you have important concerns about you or your baby.     Call if:    You are having contractions at regular intervals about 5-6 minutes apart lasting 30-60 seconds and becoming increasingly more intense     You have an uncontrollable gush of fluid from your vagina or feel a pop and gush like your water has broken    You have HEAVY bleeding, like heavy period, blood running down your legs, or  soaking a pad.     Some bleeding after a pelvic exam, after intercourse, or in labor when your cervix is dilating is normal and is referred to as \"bloody show\"    You have severe, continuous back or abdominal pain    You feel it is time to go to the hospital    If this is your first labor, call when contractions are very intense and have been about every 3-4 minutes for about an hour    If it is your second labor or more, call when contractions are strong and about every 3-5 minutes or sooner depending on your level of discomfort.     Keep in mind we are always here for you! If you have questions, concerns please don't hesitate to call us.     What to eat/drink in labor: Drink plenty of fluid (water most importantly, juice, soda or tea without caffeine). Eat rice, pasta, soup, cereal, bread/toast, and fruit. Avoid dairy and greasy food as they are difficult to digest and you may experience some nausea during labor.    Comfort measures:    Baths and showers (ok even with ruptured membranes, it may temporarily slow contractions if you are still in the early stage of labor)    Warm/hot packs for back pain or discomfort    Back, belly, or thigh massages    Standing, rocking, walking, leaning over bed or tables, side-lying and " sleeping    Miscellaneous:     Contractions are timed from the beginning of one to the beginning of the next    Try hard to sleep during the early stage of labor when you are not that uncomfortable. Timing of contractions at this point is not important    Even if you cannot sleep, resting in bed or on the couch can help you maintain your energy for labor    When you arrive at the hospital the nurse will check your baby's heartbeat, check your cervix, and will call us. The midwife on call will come in and be with you when you are in active labor    After hours you need to enter the hospital through the emergency room

## 2020-06-25 LAB
GP B STREP DNA SPEC QL NAA+PROBE: NEGATIVE
SPECIMEN SOURCE: NORMAL

## 2020-06-29 NOTE — PROGRESS NOTES
"Feels nervous but ready for baby.  Has been having \"contractions on and off\", is concerned that she may be in labor.  Fetal movement: positive  Denies vaginal bleeding/lof, some contractions that are intermittent and resolve with comfort measures  SVE: FT/50%/-2/soft/mid  Warning signs reviewed  Labor signs and symptoms discussed, aware of numbers to call  Lengthy discussion about comfort measures, labor, when to call, encouraged to call anytime with any questions or concerns  Denies s/sx of preeclampsia and aware of what to report  Plans for birth control after baby: POPs  Return to clinic 1 week    Trudy STORY CNM          "

## 2020-06-29 NOTE — PATIENT INSTRUCTIONS
"PREECLAMPSIA SIGNS AND SYMPTOMS    Preeclampsia is a dangerous condition that some women develop in the second half of pregnancy. It can also begin after the baby is born.  Preeclampsia causes high blood pressure and can cause problems with many organ systems in your body.  It can also affect the growth of your baby. The exact cause of preeclampsia is unknown, however, there are signs and symptoms to watch for:    -A bad headache that doesn't improve with Tylenol  -Visual changes such as spots, flashes of light, blurry vision  -Pain in the upper right part of your abdomen, especially under the ribs that doesn't go away  -Nausea and/or vomiting  -Feeling extremely tired  -Yellowing of the skin and/or eyes  -Feeling \"not quite right\" or that something is wrong  -An extreme amount of swelling (some swelling in pregnancy is very normal)    If your midwife feels that you are developing preeclampsia, you will have lab tests drawn and will be monitored very closely.     If you are experiencing anyof these symptoms, call the [facility] immediately at [phone].    Labor Instructions for Midwife Patients    When to call:  Both during and after office hours call  [phone]. There is a triage RN to take your calls and answer your questions 24 hours a day.  If they cannot answer your question they will page the midwife on call for you.    When to call:  Call anytime you have important concerns about you or your baby.     Call if:    You are having contractions at regular intervals about 5-6 minutes apart lasting 30-60 seconds and becoming increasingly more intense     You have an uncontrollable gush of fluid from your vagina or feel a pop and gush like your water has broken    You have HEAVY bleeding, like heavy period, blood running down your legs, or  soaking a pad.     Some bleeding after a pelvic exam, after intercourse, or in labor when your cervix is dilating is normal and is referred to as " "\"bloody show\"    You have severe, continuous back or abdominal pain    You feel it is time to go to the hospital    If this is your first labor, call when contractions are very intense and have been about every 3-4 minutes for about an hour    If it is your second labor or more, call when contractions are strong and about every 3-5 minutes or sooner depending on your level of discomfort.     Keep in mind we are always here for you! If you have questions, concerns please don't hesitate to call us.     What to eat/drink in labor: Drink plenty of fluid (water most importantly, juice, soda or tea without caffeine). Eat rice, pasta, soup, cereal, bread/toast, and fruit. Avoid dairy and greasy food as they are difficult to digest and you may experience some nausea during labor.    Comfort measures:    Baths and showers (ok even with ruptured membranes, it may temporarily slow contractions if you are still in the early stage of labor)    Warm/hot packs for back pain or discomfort    Back, belly, or thigh massages    Standing, rocking, walking, leaning over bed or tables, side-lying and sleeping    Miscellaneous:     Contractions are timed from the beginning of one to the beginning of the next    Try hard to sleep during the early stage of labor when you are not that uncomfortable. Timing of contractions at this point is not important    Even if you cannot sleep, resting in bed or on the couch can help you maintain your energy for labor    When you arrive at the hospital the nurse will check your baby's heartbeat, check your cervix, and will call us. The midwife on call will come in and be with you when you are in active labor    After hours you need to enter the hospital through the emergency room  Fetal Kick Counts    It is important to know when your baby's movements occur. We often get busy with work and life and do not pay close attention to their movements.        Women typically begin feeling movement between 18-22 " weeks of gestation, sometimes it can be earlier or later depending on where your placenta is       Movements usually begin feeling like popping or fluttering and as the baby grows they become more pronounced    Toward the end of pregnancy as the baby gets larger they may not move as much or make as big of movements. Babies have maturing sleep cycles as well as not as much room to move and flip. If you are ever concerned about your baby's movements or have not felt the baby move for a while, we recommend you do a fetal kick count. Prior to starting your count drink a glass of water or juice and eat a snack. Then lay down on your side and begin to count movements.     How to do a Fetal Kick Counts    There are many different ways to monitor your baby's movements. Movements can range from large jabs to small kicks, or wiggles.  Hiccups count!      Count 10 movements in 2 hours when resting and focusing    Count 10 movements in 12 hours when doing normal activity    We recommend that if movements occur but seem decreased that you should be seen in the clinic or hospital for evaluation within 12 hours. If fetal movement is absent or fetal kick counts are low please contact us right away.    If you ever have any concerns about your baby's movements DO NOT HESITATE to call us, we are here for you!    [facility]  [phone]

## 2020-06-30 ENCOUNTER — TELEPHONE (OUTPATIENT)
Dept: OBGYN | Facility: CLINIC | Age: 20
End: 2020-06-30

## 2020-06-30 NOTE — TELEPHONE ENCOUNTER
Patient is 37 wks pregnant and asked if she could speak with a nurse regarding hip and back pain.

## 2020-07-01 ENCOUNTER — PRENATAL OFFICE VISIT (OUTPATIENT)
Dept: MIDWIFE SERVICES | Facility: CLINIC | Age: 20
End: 2020-07-01
Payer: COMMERCIAL

## 2020-07-01 VITALS — WEIGHT: 175 LBS | BODY MASS INDEX: 31 KG/M2 | SYSTOLIC BLOOD PRESSURE: 110 MMHG | DIASTOLIC BLOOD PRESSURE: 48 MMHG

## 2020-07-01 DIAGNOSIS — J45.909 ASTHMA COMPLICATING PREGNANCY, ANTEPARTUM: ICD-10-CM

## 2020-07-01 DIAGNOSIS — O99.519 ASTHMA COMPLICATING PREGNANCY, ANTEPARTUM: ICD-10-CM

## 2020-07-01 DIAGNOSIS — O09.93 HIGH-RISK PREGNANCY IN THIRD TRIMESTER: Primary | ICD-10-CM

## 2020-07-01 DIAGNOSIS — Z3A.37 37 WEEKS GESTATION OF PREGNANCY: ICD-10-CM

## 2020-07-01 PROCEDURE — 99207 ZZC PRENATAL VISIT: CPT | Performed by: NURSE PRACTITIONER

## 2020-07-03 NOTE — TELEPHONE ENCOUNTER
Has already spoken to the midwives, she has no current concerns  Prisca Arevalo RN on 7/3/2020 at 11:57 AM

## 2020-07-06 NOTE — PROGRESS NOTES
Feels well but uncomfortable and swollen. Starting to get impatient, wondering about trying to induce herself with breast pump. Some cramping and pelvic discomfort but no regular/consistent contractions. Feels like baby has really dropped, breathing much easier and actually has room up top now.  Fetal movement: positive  Denies vaginal bleeding/lof, some contractions  Warning signs reviewed   SVE closed/50/-1  Labor signs and symptoms discussed, aware of numbers to call, discussed normal labor pattern, contractions getting closer, stronger, longer verus muscle discomforts  Patient reassured that her exam, feelings of discomfort, are normal at this point  Reviewed comfort measures and natural labor remedies such as intercourse, red raspberry leaf tea, and evening primrose oil  Would not recommend inducing with pump at this point or at all as it is not safe to person induction techniques without supervision and monitoring.  Discussed we could not induce her with her exam until 41 weeks currently  Denies s/sx of pre-eclampsia and aware of what to report  Return to clinic 1 week    PORSHA Saldaña, CNM

## 2020-07-08 ENCOUNTER — PRENATAL OFFICE VISIT (OUTPATIENT)
Dept: MIDWIFE SERVICES | Facility: CLINIC | Age: 20
End: 2020-07-08
Payer: COMMERCIAL

## 2020-07-08 VITALS — WEIGHT: 175 LBS | BODY MASS INDEX: 31 KG/M2 | DIASTOLIC BLOOD PRESSURE: 70 MMHG | SYSTOLIC BLOOD PRESSURE: 112 MMHG

## 2020-07-08 DIAGNOSIS — O09.93 HIGH-RISK PREGNANCY IN THIRD TRIMESTER: Primary | ICD-10-CM

## 2020-07-08 DIAGNOSIS — O99.519 ASTHMA COMPLICATING PREGNANCY, ANTEPARTUM: ICD-10-CM

## 2020-07-08 DIAGNOSIS — J45.909 ASTHMA COMPLICATING PREGNANCY, ANTEPARTUM: ICD-10-CM

## 2020-07-08 DIAGNOSIS — Z3A.38 38 WEEKS GESTATION OF PREGNANCY: ICD-10-CM

## 2020-07-08 DIAGNOSIS — R93.89 ABNORMAL ULTRASOUND: ICD-10-CM

## 2020-07-08 PROCEDURE — 99207 ZZC PRENATAL VISIT: CPT | Performed by: ADVANCED PRACTICE MIDWIFE

## 2020-07-08 NOTE — NURSING NOTE
"Chief Complaint   Patient presents with     Prenatal Care     38 weeks       Initial /70   Wt 79.4 kg (175 lb)   LMP  (LMP Unknown)   BMI 31.00 kg/m   Estimated body mass index is 31 kg/m  as calculated from the following:    Height as of 19: 1.6 m (5' 3\").    Weight as of this encounter: 79.4 kg (175 lb).  BP completed using cuff size: regular    Questioned patient about current smoking habits.  Pt. has never smoked.          The following HM Due: NONE    +fetal movement  ++swelling    Leisa Barrios, CMA         "

## 2020-07-12 NOTE — PATIENT INSTRUCTIONS
Post Dates Management    If you've gone beyond your due date you are probably thinking when is this baby coming!  Most babies are born on or after their due date so it is nothing to worry about.    If you are pregnant at 41 weeks we will start some increased monitoring to make sure that your baby and the placenta are healthy enough to continue your pregnancy.      We would have you come to the clinic every 3-4 days to be assessed with an ultrasound called a Biophysical profile (BPP).       This tells us how your baby is doing. We monitor fetal movement, breathing patterns, amniotic fluid level, and heart rate.     Research has shown that by 42 weeks gestation the placenta is not always healthy enough to support the pregnancy further and it is better for the baby to be born.  If you are still pregnant by 41 and a half weeks we will discuss induction of labor with you and the different options available.     Preconception Care    If you are thinking about becoming pregnant in the near future or actively trying to conceive we want to make sure you are as healthy as possible before becoming pregnant. By meeting with your midwife or provider prior to getting pregnant it allows us to address any health needs that can affect pregnancy. Please discuss your personal and family history with your midwife in order for us to identify any risk factors      If you wish to attempt pregnancy stop whichever form of contraception you use. If you have an IUD it can be removed in the office and you can start trying right away. If you take OCPs finish current pack, cycle, and then you can actively start trying      Make sure all the medications you are taking are safe for pregnancy. This is especially important for women with chronic health conditions such as diabetes, seizure disorders, and/or hypertension. If you are unsure if your medications are safe we can discuss them with you, do not abruptly stop taking something if  you've been prescribed a medication by a medical provider      Folic acid 400-800 mcg per day by mouth daily, begin this routine 1 to 12 months prior to planned conception, and continue through at least 13 weeks gestation. Some prenatal/multi-vitamins contain enough folic acid       Be up to date on all your vaccines. Avoid pregnancy for 1 month after administration of varicella/ Rubella (MMR) vaccines      We encourage all women to be at healthy BMI (<26) when attempting pregnancy, it is healthier for both you and your baby. If you are overweight you can make a healthy choice by eating better and exercising more. Waiting to get pregnant at a healthy weight is an excellent choice.                                       Eat a healthy, well-balanced diet containing lots of fresh fruit and vegetables (frozen without added sugar is okay), protein, and healthy carbohydrates such as whole wheat breads and pastas      Exercise regularly. If you are wishing to get pregnant maintain normal exercise routine. If you don't exercise this is a great time for light activity daily such as walking/swimming      Limit caffeine intake to less than 200 mg per day, typically 1-2 cups of regular coffee is about 200 mg.       Avoid cigarettes, alcohol, and recreational drug use while attempting pregnancy. If you are actively trying to conceive but you get your period or a negative pregnancy test it is okay to have alcohol in moderation until you are actively trying again      If you have the potential to toxic chemical exposures in your work place or home please use special precautions    Menstrual Cycles      Knowing your cycle is incredibly important when attempting pregnancy      Your cycle begins day 1 of your period and goes until the 1st day of your next period. Typically women have 28-32 day cycles. If your cycles are shorter or longer it can be a normal variation.       Women typically ovulate in the middle of their  "cycle      There are many great apps that can help you track you cycle monthly to establish when you are ovulating      You may also start taking your temperature daily with a basal body temp thermometer to help identify when you ovulate      There are also ovulation predictor kits available over the counter at the pharmacy      If you want more information please contact your midwife      It can take up to 1 year for a woman under the age of 35 or 6 months for a woman over the age of 35 to conceive. If it takes longer than this we would like to evaluate you for fertility issues.       PREECLAMPSIA SIGNS AND SYMPTOMS    Preeclampsia is a dangerous condition that some women develop in the second half of pregnancy. It can also begin after the baby is born.  Preeclampsia causes high blood pressure and can cause problems with many organ systems in your body.  It can also affect the growth of your baby. The exact cause of preeclampsia is unknown, however, there are signs and symptoms to watch for:    -A bad headache that doesn't improve with Tylenol  -Visual changes such as spots, flashes of light, blurry vision  -Pain in the upper right part of your abdomen, especially under the ribs that doesn't go away  -Nausea and/or vomiting  -Feeling extremely tired  -Yellowing of the skin and/or eyes  -Feeling \"not quite right\" or that something is wrong  -An extreme amount of swelling (some swelling in pregnancy is very normal)    If your midwife feels that you are developing preeclampsia, you will have lab tests drawn and will be monitored very closely.     If you are experiencing anyof these symptoms, call the [facility] immediately at [phone].  "

## 2020-07-12 NOTE — PROGRESS NOTES
Having back pain, and hands/feet are swelling. Comfort measures discussed.     Did try using the breast pump last week to see if she could induce her labor. Received ~1ounce of colostrum, but no cramping. Advised not to continue using the breast pump for this reason. Discussed unable to determine fetal well being when using a breast pump to initiate contractions.   Reviewed IOL guidelines and when we can consider this option. Has to meet criteria  SVE 1/50/-2, vtx  Balderas Score: Balderas Score:4      Had one episode of decreased FM last week. Discussed Fetal kick counts.   Fetal movement: positive     Denies vaginal bleeding/lof, some contractions. Had cramping last night, woke this morning and cramping had subside. Warning signs reviewed   Labor signs and symptoms discussed    Has been getting headaches on/off. Intermittently. Last 30 minutes, but sometimes 2-3 times a day  Had one episode of blurry vision, thinks maybe it was related to her contacts.   Discused s/sx of preeclampsia and aware of what to report.    Return to clinic 1 week    Joan STORY CNM

## 2020-07-14 ENCOUNTER — PRENATAL OFFICE VISIT (OUTPATIENT)
Dept: MIDWIFE SERVICES | Facility: CLINIC | Age: 20
End: 2020-07-14
Payer: COMMERCIAL

## 2020-07-14 VITALS — BODY MASS INDEX: 31.53 KG/M2 | DIASTOLIC BLOOD PRESSURE: 62 MMHG | WEIGHT: 178 LBS | SYSTOLIC BLOOD PRESSURE: 122 MMHG

## 2020-07-14 DIAGNOSIS — O99.519 ASTHMA COMPLICATING PREGNANCY, ANTEPARTUM: ICD-10-CM

## 2020-07-14 DIAGNOSIS — J45.909 ASTHMA COMPLICATING PREGNANCY, ANTEPARTUM: ICD-10-CM

## 2020-07-14 PROCEDURE — 99207 ZZC PRENATAL VISIT: CPT | Performed by: ADVANCED PRACTICE MIDWIFE

## 2020-07-14 RX ORDER — ALBUTEROL SULFATE 90 UG/1
2 AEROSOL, METERED RESPIRATORY (INHALATION) EVERY 6 HOURS
Qty: 1 INHALER | Refills: 0 | Status: SHIPPED | OUTPATIENT
Start: 2020-07-14 | End: 2021-03-09

## 2020-07-18 ENCOUNTER — NURSE TRIAGE (OUTPATIENT)
Dept: NURSING | Facility: CLINIC | Age: 20
End: 2020-07-18

## 2020-07-18 ENCOUNTER — HOSPITAL ENCOUNTER (OUTPATIENT)
Facility: CLINIC | Age: 20
Discharge: HOME | End: 2020-07-18
Attending: ADVANCED PRACTICE MIDWIFE | Admitting: NURSE PRACTITIONER
Payer: COMMERCIAL

## 2020-07-18 VITALS — SYSTOLIC BLOOD PRESSURE: 113 MMHG | DIASTOLIC BLOOD PRESSURE: 63 MMHG | TEMPERATURE: 97.8 F | RESPIRATION RATE: 16 BRPM

## 2020-07-18 LAB
ALT SERPL W P-5'-P-CCNC: 20 U/L (ref 0–50)
AST SERPL W P-5'-P-CCNC: 13 U/L (ref 0–45)
CREAT SERPL-MCNC: 0.47 MG/DL (ref 0.52–1.04)
CREAT UR-MCNC: 67 MG/DL
ERYTHROCYTE [DISTWIDTH] IN BLOOD BY AUTOMATED COUNT: 13.8 % (ref 10–15)
GFR SERPL CREATININE-BSD FRML MDRD: >90 ML/MIN/{1.73_M2}
HCT VFR BLD AUTO: 33.6 % (ref 35–47)
HGB BLD-MCNC: 11.3 G/DL (ref 11.7–15.7)
MCH RBC QN AUTO: 28.4 PG (ref 26.5–33)
MCHC RBC AUTO-ENTMCNC: 33.6 G/DL (ref 31.5–36.5)
MCV RBC AUTO: 84 FL (ref 78–100)
PLATELET # BLD AUTO: 185 10E9/L (ref 150–450)
PROT UR-MCNC: 0.18 G/L
PROT/CREAT 24H UR: 0.27 G/G CR (ref 0–0.2)
RBC # BLD AUTO: 3.98 10E12/L (ref 3.8–5.2)
RUPTURE OF FETAL MEMBRANES BY ROM PLUS: NEGATIVE
WBC # BLD AUTO: 7.7 10E9/L (ref 4–11)

## 2020-07-18 PROCEDURE — 59025 FETAL NON-STRESS TEST: CPT | Mod: 26 | Performed by: NURSE PRACTITIONER

## 2020-07-18 PROCEDURE — 59025 FETAL NON-STRESS TEST: CPT

## 2020-07-18 PROCEDURE — 85027 COMPLETE CBC AUTOMATED: CPT | Performed by: NURSE PRACTITIONER

## 2020-07-18 PROCEDURE — 84460 ALANINE AMINO (ALT) (SGPT): CPT | Performed by: NURSE PRACTITIONER

## 2020-07-18 PROCEDURE — 84450 TRANSFERASE (AST) (SGOT): CPT | Performed by: NURSE PRACTITIONER

## 2020-07-18 PROCEDURE — 82565 ASSAY OF CREATININE: CPT | Performed by: NURSE PRACTITIONER

## 2020-07-18 PROCEDURE — 84156 ASSAY OF PROTEIN URINE: CPT | Performed by: NURSE PRACTITIONER

## 2020-07-18 PROCEDURE — G0463 HOSPITAL OUTPT CLINIC VISIT: HCPCS | Mod: 25

## 2020-07-18 PROCEDURE — 36415 COLL VENOUS BLD VENIPUNCTURE: CPT | Performed by: NURSE PRACTITIONER

## 2020-07-18 PROCEDURE — 84112 EVAL AMNIOTIC FLUID PROTEIN: CPT | Performed by: NURSE PRACTITIONER

## 2020-07-18 RX ORDER — ONDANSETRON 2 MG/ML
4 INJECTION INTRAMUSCULAR; INTRAVENOUS EVERY 6 HOURS PRN
Status: DISCONTINUED | OUTPATIENT
Start: 2020-07-18 | End: 2020-07-18 | Stop reason: HOSPADM

## 2020-07-18 ASSESSMENT — ACTIVITIES OF DAILY LIVING (ADL)
RETIRED_COMMUNICATION: 0-->UNDERSTANDS/COMMUNICATES WITHOUT DIFFICULTY
SWALLOWING: 0-->SWALLOWS FOODS/LIQUIDS WITHOUT DIFFICULTY
TRANSFERRING: 0-->INDEPENDENT
DRESS: 0-->INDEPENDENT
TOILETING: 0-->INDEPENDENT
COGNITION: 0 - NO COGNITION ISSUES REPORTED
FALL_HISTORY_WITHIN_LAST_SIX_MONTHS: NO
RETIRED_EATING: 0-->INDEPENDENT
AMBULATION: 0-->INDEPENDENT
BATHING: 0-->INDEPENDENT

## 2020-07-18 NOTE — DISCHARGE INSTRUCTIONS
Discharge Instruction for Undelivered Patients      You were seen for: Membrane Assessment  We Consulted: Trudy Mike RN  You had (Test or Medicine): ROM plus, preeclampsia labs, serial blood pressures, cervical exam, fetal and uterine monitoring.     Diet:   Drink 8 to 12 glasses of liquids (milk, juice, water) every day.  You may eat meals and snacks.     Activity:  Count fetal kicks everyday (see handout)  Call your doctor or nurse midwife if your baby is moving less than usual.     Call your provider if you notice:  Swelling in your face or increased swelling in your hands or legs.  Headaches that are not relieved by Tylenol (acetaminophen).  Changes in your vision (blurring: seeing spots or stars.)  Nausea (sick to your stomach) and vomiting (throwing up).   Weight gain of 5 pounds or more per week.  Heartburn that doesn't go away.  Signs of bladder infection: pain when you urinate (use the toilet), need to go more often and more urgently.  The bag of marx (rupture of membranes) breaks, or you notice leaking in your underwear.  Bright red blood in your underwear.  Abdominal (lower belly) or stomach pain.  For first baby: Contractions (tightening) less than 5 minutes apart for one hour or more.  Second (plus) baby: Contractions (tightening) less than 10 minutes apart and getting stronger.  *If less than 34 weeks: Contractions (tightenings) more than 6 times in one hour.  Increase or change in vaginal discharge (note the color and amount)  Other:  Your ROM plus is negative indicating that the amniotic sac is intact.  The NST is reactive indicating fetal wellbeing.  The preeclampsia labs are within normal limits.    Follow-up:  As scheduled in the clinic

## 2020-07-18 NOTE — PROGRESS NOTES
Fetal Non-Stress Test     NST Start time:  NST Stop time:    Fetal heart tones: Fetus A  Baseline 140  Accelerations: Present  Decelerations:  None    NST Interpretation: junior STORY CNM

## 2020-07-18 NOTE — PLAN OF CARE
Patient arrives to the [facility] [place] [place] to rule out SROM.  Patient stated she woke up and had a small amount of clear fluid on her underwear and states it looked clear.  The patient is also having cramping that she rates at a 4 for discomfort.  She states they feel like cramps.  The patient also had a brief episode of visual disturbance yesterday and she currently is experiencing a dull headache on the right side of her head above her right ear.  The blood pressure is within normal limits, no swelling or SOB.  The patient denies nausea, vomiting or epigastric discomfort.  Preeclampsia labs are ordered, a ROM plus is obtained and the cervix is checked.  The cervix was difficult to reach due to the high station of the fetal head and the posterior position of the cervix.  The patient states that her last cervical exam at the clinic was one cm.  The patient is one cm with today's cervical check.  Trudy Mike is updated with the patient's lab results, NST, cervical exam, contractions, and blood pressures.  The patient is discharged to home.  The patient's ROM plus is negative and the preeclampsia labs were normal.

## 2020-07-18 NOTE — TELEPHONE ENCOUNTER
"\"I am 39w3d and I have had to change my underwear twice this morning. I am leaking a clear fluid. It soaked through my clothes. No contractions. I did have a small amount of mucous with blood this morning. \" Denies other sx. Baby is active. Triaged and advised L&D. Notified Ana L&D Yoanna at 8:18 am that patient is on her way.  Brenda Simpson RN [facility]        Reason for Disposition    Leakage of fluid from vagina    Additional Information    Negative: < 20 weeks pregnant    Negative: Vaginal bleeding    Protocols used: PREGNANCY - RUPTURE OF [id number]      "

## 2020-07-20 ENCOUNTER — TELEPHONE (OUTPATIENT)
Dept: MIDWIFE SERVICES | Facility: CLINIC | Age: 20
End: 2020-07-20

## 2020-07-20 NOTE — TELEPHONE ENCOUNTER
39w5d    GBS negative  Last SVE:  in MAC    Evaluated  in MAC for early labor, ctxing every 5 min and sent home without change  Artur again since 400, taking Tylenol for the pains  Every 3-5 min now. Talking and carrying on conversation.  No LOV or VB  Reporting less fetal movement but has been trying to sleep and concentrating on pain.  Wants to know what she can do to get labor going as she is uncomfortable.    Encouraged fluids and warm tub bath and will have a midwife call her to discuss.  Sent [facility] a message and will route chart to midwife pool    Pt verbalized understanding, in agreement with plan, and voiced no further questions.  Emerald Valle RN on 2020 at 10:57 AM

## 2020-07-20 NOTE — TELEPHONE ENCOUNTER
"Call back to Carolyn after request to speak with a midwife. Carolyn states she was seen in the [facility] on Saturday for R/O labor. She made no cervical change after a few hours and was sent home. Today she states contractions began again at 0400 and are 3 minutes apart, lasting 30 seconds. She has been taking Tylenol but it just \"makes me sleep, and then I wake up again with the cramping/contractions.\"   Denies LOF, or vaginal bleeding. Less FM today, but still positive.   Carolyn is wondering what she can do at home so she does not have to go to the hospital and possibly be sent home again.   Encouraged to continue comfort measures, (warm bath/shower, Tylenol, rest, increased hydration).    Counseled on too early for elective induction. Has an appointment in the clinic tomorrow morning. Explained the midwife can check her cervix and possibly strip her membranes but that it is not a guarantee.     Warning s/sx discussed and when to call.     Joan STORY CNM    "

## 2020-07-21 ENCOUNTER — PRENATAL OFFICE VISIT (OUTPATIENT)
Dept: MIDWIFE SERVICES | Facility: CLINIC | Age: 20
End: 2020-07-21
Payer: COMMERCIAL

## 2020-07-21 VITALS — BODY MASS INDEX: 31.99 KG/M2 | DIASTOLIC BLOOD PRESSURE: 62 MMHG | SYSTOLIC BLOOD PRESSURE: 128 MMHG | WEIGHT: 180.6 LBS

## 2020-07-21 DIAGNOSIS — Z3A.39 39 WEEKS GESTATION OF PREGNANCY: ICD-10-CM

## 2020-07-21 DIAGNOSIS — O99.519 ASTHMA COMPLICATING PREGNANCY, ANTEPARTUM: ICD-10-CM

## 2020-07-21 DIAGNOSIS — O09.93 HIGH-RISK PREGNANCY IN THIRD TRIMESTER: Primary | ICD-10-CM

## 2020-07-21 DIAGNOSIS — J45.909 ASTHMA COMPLICATING PREGNANCY, ANTEPARTUM: ICD-10-CM

## 2020-07-21 PROCEDURE — 99207 ZZC PRENATAL VISIT: CPT | Performed by: ADVANCED PRACTICE MIDWIFE

## 2020-07-23 ENCOUNTER — PRENATAL OFFICE VISIT (OUTPATIENT)
Dept: MIDWIFE SERVICES | Facility: CLINIC | Age: 20
End: 2020-07-23
Payer: COMMERCIAL

## 2020-07-23 VITALS — DIASTOLIC BLOOD PRESSURE: 72 MMHG | BODY MASS INDEX: 31.67 KG/M2 | SYSTOLIC BLOOD PRESSURE: 126 MMHG | WEIGHT: 178.8 LBS

## 2020-07-23 DIAGNOSIS — O09.93 HIGH-RISK PREGNANCY IN THIRD TRIMESTER: Primary | ICD-10-CM

## 2020-07-23 PROCEDURE — 99207 ZZC PRENATAL VISIT: CPT | Performed by: ADVANCED PRACTICE MIDWIFE

## 2020-07-23 NOTE — PATIENT INSTRUCTIONS
Post Dates Management    If you've gone beyond your due date you are probably thinking when is this baby coming!  Most babies are born on or after their due date so it is nothing to worry about.    If you are pregnant at 41 weeks we will start some increased monitoring to make sure that your baby and the placenta are healthy enough to continue your pregnancy.      We would have you come to the clinic every 3-4 days to be assessed with an ultrasound called a Biophysical profile (BPP).       This tells us how your baby is doing. We monitor fetal movement, breathing patterns, amniotic fluid level, and heart rate.     Research has shown that by 42 weeks gestation the placenta is not always healthy enough to support the pregnancy further and it is better for the baby to be born.  If you are still pregnant by 41 and a half weeks we will discuss induction of labor with you and the different options available.     Preconception Care    If you are thinking about becoming pregnant in the near future or actively trying to conceive we want to make sure you are as healthy as possible before becoming pregnant. By meeting with your midwife or provider prior to getting pregnant it allows us to address any health needs that can affect pregnancy. Please discuss your personal and family history with your midwife in order for us to identify any risk factors      If you wish to attempt pregnancy stop whichever form of contraception you use. If you have an IUD it can be removed in the office and you can start trying right away. If you take OCPs finish current pack, cycle, and then you can actively start trying      Make sure all the medications you are taking are safe for pregnancy. This is especially important for women with chronic health conditions such as diabetes, seizure disorders, and/or hypertension. If you are unsure if your medications are safe we can discuss them with you, do not abruptly stop taking something if  you've been prescribed a medication by a medical provider      Folic acid 400-800 mcg per day by mouth daily, begin this routine 1 to 12 months prior to planned conception, and continue through at least 13 weeks gestation. Some prenatal/multi-vitamins contain enough folic acid       Be up to date on all your vaccines. Avoid pregnancy for 1 month after administration of varicella/ Rubella (MMR) vaccines      We encourage all women to be at healthy BMI (<26) when attempting pregnancy, it is healthier for both you and your baby. If you are overweight you can make a healthy choice by eating better and exercising more. Waiting to get pregnant at a healthy weight is an excellent choice.                                       Eat a healthy, well-balanced diet containing lots of fresh fruit and vegetables (frozen without added sugar is okay), protein, and healthy carbohydrates such as whole wheat breads and pastas      Exercise regularly. If you are wishing to get pregnant maintain normal exercise routine. If you don't exercise this is a great time for light activity daily such as walking/swimming      Limit caffeine intake to less than 200 mg per day, typically 1-2 cups of regular coffee is about 200 mg.       Avoid cigarettes, alcohol, and recreational drug use while attempting pregnancy. If you are actively trying to conceive but you get your period or a negative pregnancy test it is okay to have alcohol in moderation until you are actively trying again      If you have the potential to toxic chemical exposures in your work place or home please use special precautions    Menstrual Cycles      Knowing your cycle is incredibly important when attempting pregnancy      Your cycle begins day 1 of your period and goes until the 1st day of your next period. Typically women have 28-32 day cycles. If your cycles are shorter or longer it can be a normal variation.       Women typically ovulate in the middle of their  "cycle      There are many great apps that can help you track you cycle monthly to establish when you are ovulating      You may also start taking your temperature daily with a basal body temp thermometer to help identify when you ovulate      There are also ovulation predictor kits available over the counter at the pharmacy      If you want more information please contact your midwife      It can take up to 1 year for a woman under the age of 35 or 6 months for a woman over the age of 35 to conceive. If it takes longer than this we would like to evaluate you for fertility issues.       PREECLAMPSIA SIGNS AND SYMPTOMS    Preeclampsia is a dangerous condition that some women develop in the second half of pregnancy. It can also begin after the baby is born.  Preeclampsia causes high blood pressure and can cause problems with many organ systems in your body.  It can also affect the growth of your baby. The exact cause of preeclampsia is unknown, however, there are signs and symptoms to watch for:    -A bad headache that doesn't improve with Tylenol  -Visual changes such as spots, flashes of light, blurry vision  -Pain in the upper right part of your abdomen, especially under the ribs that doesn't go away  -Nausea and/or vomiting  -Feeling extremely tired  -Yellowing of the skin and/or eyes  -Feeling \"not quite right\" or that something is wrong  -An extreme amount of swelling (some swelling in pregnancy is very normal)    If your midwife feels that you are developing preeclampsia, you will have lab tests drawn and will be monitored very closely.     If you are experiencing anyof these symptoms, call the [facility] immediately at [phone].  "

## 2020-07-23 NOTE — PROGRESS NOTES
Still not sleeping well. Still in pain, having cramps and back pain. Still using Tylenol, warm bath/shower, walking. Swelling at night. Encouraged to continue with comfort measures.   Fetal movement: positive  Denies vaginal bleeding/lof, a lot of  contractions. Thinks she has been losing her mucous plug.   SVE: 1.5/50/-2 Membranes stripped, per request  Balderas Score 5  Discussed not meeting criteria for an elective IOL.   Offered an appt on Monday for a BPP and another membrane sweep or just scheduling her IOL for 7/29/20 (41w0d). She opts for an appt Monday and then discuss when to set up an IOL.   Warning signs reviewed   Labor signs and symptoms discussed  Denies s/sx of pre-eclampsia and aware of what to report      Return to clinic 1 week    Joan STORY CNM

## 2020-07-24 ENCOUNTER — HOSPITAL ENCOUNTER (INPATIENT)
Facility: CLINIC | Age: 20
LOS: 4 days | Discharge: HOME | End: 2020-07-28
Attending: ADVANCED PRACTICE MIDWIFE | Admitting: NURSE PRACTITIONER
Payer: COMMERCIAL

## 2020-07-24 ENCOUNTER — ANESTHESIA EVENT (OUTPATIENT)
Dept: OBGYN | Facility: CLINIC | Age: 20
End: 2020-07-24
Payer: COMMERCIAL

## 2020-07-24 ENCOUNTER — NURSE TRIAGE (OUTPATIENT)
Dept: NURSING | Facility: CLINIC | Age: 20
End: 2020-07-24

## 2020-07-24 ENCOUNTER — ANESTHESIA (OUTPATIENT)
Dept: OBGYN | Facility: CLINIC | Age: 20
End: 2020-07-24
Payer: COMMERCIAL

## 2020-07-24 DIAGNOSIS — D62 ANEMIA DUE TO BLOOD LOSS, ACUTE: ICD-10-CM

## 2020-07-24 LAB
ABO + RH BLD: NORMAL
ABO + RH BLD: NORMAL
BASOPHILS # BLD AUTO: 0 10E9/L (ref 0–0.2)
BASOPHILS NFR BLD AUTO: 0.1 %
BLD GP AB SCN SERPL QL: NORMAL
BLOOD BANK CMNT PATIENT-IMP: NORMAL
DIFFERENTIAL METHOD BLD: ABNORMAL
EOSINOPHIL # BLD AUTO: 0 10E9/L (ref 0–0.7)
EOSINOPHIL NFR BLD AUTO: 0.2 %
ERYTHROCYTE [DISTWIDTH] IN BLOOD BY AUTOMATED COUNT: 13.8 % (ref 10–15)
HCT VFR BLD AUTO: 35.8 % (ref 35–47)
HGB BLD-MCNC: 12.2 G/DL (ref 11.7–15.7)
IMM GRANULOCYTES # BLD: 0.1 10E9/L (ref 0–0.4)
IMM GRANULOCYTES NFR BLD: 0.5 %
LABORATORY COMMENT REPORT: NORMAL
LYMPHOCYTES # BLD AUTO: 1.2 10E9/L (ref 0.8–5.3)
LYMPHOCYTES NFR BLD AUTO: 7.1 %
MCH RBC QN AUTO: 28.4 PG (ref 26.5–33)
MCHC RBC AUTO-ENTMCNC: 34.1 G/DL (ref 31.5–36.5)
MCV RBC AUTO: 83 FL (ref 78–100)
MONOCYTES # BLD AUTO: 1.1 10E9/L (ref 0–1.3)
MONOCYTES NFR BLD AUTO: 6.9 %
NEUTROPHILS # BLD AUTO: 14.1 10E9/L (ref 1.6–8.3)
NEUTROPHILS NFR BLD AUTO: 85.2 %
NRBC # BLD AUTO: 0 10*3/UL
NRBC BLD AUTO-RTO: 0 /100
PLATELET # BLD AUTO: 195 10E9/L (ref 150–450)
RBC # BLD AUTO: 4.29 10E12/L (ref 3.8–5.2)
RUPTURE OF FETAL MEMBRANES BY ROM PLUS: POSITIVE
SARS-COV-2 RNA SPEC QL NAA+PROBE: NEGATIVE
SARS-COV-2 RNA SPEC QL NAA+PROBE: NORMAL
SPECIMEN EXP DATE BLD: NORMAL
SPECIMEN SOURCE: NORMAL
SPECIMEN SOURCE: NORMAL
T PALLIDUM AB SER QL: NONREACTIVE
WBC # BLD AUTO: 16.6 10E9/L (ref 4–11)

## 2020-07-24 PROCEDURE — 59510 CESAREAN DELIVERY: CPT | Performed by: OBSTETRICS & GYNECOLOGY

## 2020-07-24 PROCEDURE — 25000128 H RX IP 250 OP 636: Performed by: OBSTETRICS & GYNECOLOGY

## 2020-07-24 PROCEDURE — 71000013 ZZH RECOVERY PHASE 1 LEVEL 1 EA ADDTL HR: Performed by: OBSTETRICS & GYNECOLOGY

## 2020-07-24 PROCEDURE — 25800030 ZZH RX IP 258 OP 636: Performed by: ANESTHESIOLOGY

## 2020-07-24 PROCEDURE — 25000125 ZZHC RX 250: Performed by: OBSTETRICS & GYNECOLOGY

## 2020-07-24 PROCEDURE — 59514 CESAREAN DELIVERY ONLY: CPT | Mod: AS | Performed by: NURSE PRACTITIONER

## 2020-07-24 PROCEDURE — U0003 INFECTIOUS AGENT DETECTION BY NUCLEIC ACID (DNA OR RNA); SEVERE ACUTE RESPIRATORY SYNDROME CORONAVIRUS 2 (SARS-COV-2) (CORONAVIRUS DISEASE [COVID-19]), AMPLIFIED PROBE TECHNIQUE, MAKING USE OF HIGH THROUGHPUT TECHNOLOGIES AS DESCRIBED BY CMS-2020-01-R: HCPCS | Performed by: ADVANCED PRACTICE MIDWIFE

## 2020-07-24 PROCEDURE — 25000128 H RX IP 250 OP 636

## 2020-07-24 PROCEDURE — 25800030 ZZH RX IP 258 OP 636: Performed by: OBSTETRICS & GYNECOLOGY

## 2020-07-24 PROCEDURE — G0463 HOSPITAL OUTPT CLINIC VISIT: HCPCS | Mod: 25

## 2020-07-24 PROCEDURE — 12000035 ZZH R&B POSTPARTUM

## 2020-07-24 PROCEDURE — 84112 EVAL AMNIOTIC FLUID PROTEIN: CPT | Performed by: ADVANCED PRACTICE MIDWIFE

## 2020-07-24 PROCEDURE — 27210794 ZZH OR GENERAL SUPPLY STERILE: Performed by: OBSTETRICS & GYNECOLOGY

## 2020-07-24 PROCEDURE — 36000058 ZZH SURGERY LEVEL 3 EA 15 ADDTL MIN: Performed by: OBSTETRICS & GYNECOLOGY

## 2020-07-24 PROCEDURE — 3E0R3BZ INTRODUCTION OF ANESTHETIC AGENT INTO SPINAL CANAL, PERCUTANEOUS APPROACH: ICD-10-PCS | Performed by: ANESTHESIOLOGY

## 2020-07-24 PROCEDURE — 25000128 H RX IP 250 OP 636: Performed by: NURSE PRACTITIONER

## 2020-07-24 PROCEDURE — 86901 BLOOD TYPING SEROLOGIC RH(D): CPT | Performed by: ADVANCED PRACTICE MIDWIFE

## 2020-07-24 PROCEDURE — 25000128 H RX IP 250 OP 636: Performed by: ANESTHESIOLOGY

## 2020-07-24 PROCEDURE — 25000125 ZZHC RX 250: Performed by: NURSE ANESTHETIST, CERTIFIED REGISTERED

## 2020-07-24 PROCEDURE — 86850 RBC ANTIBODY SCREEN: CPT | Performed by: ADVANCED PRACTICE MIDWIFE

## 2020-07-24 PROCEDURE — 36415 COLL VENOUS BLD VENIPUNCTURE: CPT | Performed by: ADVANCED PRACTICE MIDWIFE

## 2020-07-24 PROCEDURE — 25800030 ZZH RX IP 258 OP 636: Performed by: NURSE ANESTHETIST, CERTIFIED REGISTERED

## 2020-07-24 PROCEDURE — 86900 BLOOD TYPING SEROLOGIC ABO: CPT | Performed by: ADVANCED PRACTICE MIDWIFE

## 2020-07-24 PROCEDURE — 37000008 ZZH ANESTHESIA TECHNICAL FEE, 1ST 30 MIN: Performed by: OBSTETRICS & GYNECOLOGY

## 2020-07-24 PROCEDURE — 37000009 ZZH ANESTHESIA TECHNICAL FEE, EACH ADDTL 15 MIN: Performed by: OBSTETRICS & GYNECOLOGY

## 2020-07-24 PROCEDURE — 36000056 ZZH SURGERY LEVEL 3 1ST 30 MIN: Performed by: OBSTETRICS & GYNECOLOGY

## 2020-07-24 PROCEDURE — 25000128 H RX IP 250 OP 636: Performed by: NURSE ANESTHETIST, CERTIFIED REGISTERED

## 2020-07-24 PROCEDURE — 25000132 ZZH RX MED GY IP 250 OP 250 PS 637: Performed by: OBSTETRICS & GYNECOLOGY

## 2020-07-24 PROCEDURE — 59025 FETAL NON-STRESS TEST: CPT

## 2020-07-24 PROCEDURE — 00HU33Z INSERTION OF INFUSION DEVICE INTO SPINAL CANAL, PERCUTANEOUS APPROACH: ICD-10-PCS | Performed by: ANESTHESIOLOGY

## 2020-07-24 PROCEDURE — 25000132 ZZH RX MED GY IP 250 OP 250 PS 637: Performed by: NURSE PRACTITIONER

## 2020-07-24 PROCEDURE — 86780 TREPONEMA PALLIDUM: CPT | Performed by: ADVANCED PRACTICE MIDWIFE

## 2020-07-24 PROCEDURE — 85025 COMPLETE CBC W/AUTO DIFF WBC: CPT | Performed by: ADVANCED PRACTICE MIDWIFE

## 2020-07-24 PROCEDURE — 37000011 ZZH ANESTHESIA WARD SERVICE

## 2020-07-24 PROCEDURE — 25000132 ZZH RX MED GY IP 250 OP 250 PS 637: Performed by: ADVANCED PRACTICE MIDWIFE

## 2020-07-24 PROCEDURE — 71000012 ZZH RECOVERY PHASE 1 LEVEL 1 FIRST HR: Performed by: OBSTETRICS & GYNECOLOGY

## 2020-07-24 PROCEDURE — 25000128 H RX IP 250 OP 636: Performed by: ADVANCED PRACTICE MIDWIFE

## 2020-07-24 RX ORDER — ACETAMINOPHEN 325 MG/1
975 TABLET ORAL EVERY 6 HOURS
Status: COMPLETED | OUTPATIENT
Start: 2020-07-24 | End: 2020-07-27

## 2020-07-24 RX ORDER — ONDANSETRON 2 MG/ML
INJECTION INTRAMUSCULAR; INTRAVENOUS PRN
Status: DISCONTINUED | OUTPATIENT
Start: 2020-07-24 | End: 2020-07-24

## 2020-07-24 RX ORDER — IBUPROFEN 400 MG/1
800 TABLET, FILM COATED ORAL EVERY 6 HOURS PRN
Status: DISCONTINUED | OUTPATIENT
Start: 2020-07-24 | End: 2020-07-28 | Stop reason: HOSPADM

## 2020-07-24 RX ORDER — AMOXICILLIN 250 MG
1 CAPSULE ORAL 2 TIMES DAILY
Status: DISCONTINUED | OUTPATIENT
Start: 2020-07-24 | End: 2020-07-28 | Stop reason: HOSPADM

## 2020-07-24 RX ORDER — HYDROXYZINE HYDROCHLORIDE 50 MG/1
TABLET, FILM COATED ORAL
Status: DISCONTINUED
Start: 2020-07-24 | End: 2020-07-24 | Stop reason: HOSPADM

## 2020-07-24 RX ORDER — ONDANSETRON 2 MG/ML
4 INJECTION INTRAMUSCULAR; INTRAVENOUS EVERY 6 HOURS PRN
Status: DISCONTINUED | OUTPATIENT
Start: 2020-07-24 | End: 2020-07-24

## 2020-07-24 RX ORDER — SODIUM CHLORIDE, SODIUM LACTATE, POTASSIUM CHLORIDE, CALCIUM CHLORIDE 600; 310; 30; 20 MG/100ML; MG/100ML; MG/100ML; MG/100ML
INJECTION, SOLUTION INTRAVENOUS CONTINUOUS
Status: DISCONTINUED | OUTPATIENT
Start: 2020-07-24 | End: 2020-07-25

## 2020-07-24 RX ORDER — ACETAMINOPHEN 325 MG/1
975 TABLET ORAL EVERY 6 HOURS
Status: DISCONTINUED | OUTPATIENT
Start: 2020-07-24 | End: 2020-07-24

## 2020-07-24 RX ORDER — MODIFIED LANOLIN
OINTMENT (GRAM) TOPICAL
Status: DISCONTINUED | OUTPATIENT
Start: 2020-07-24 | End: 2020-07-28 | Stop reason: HOSPADM

## 2020-07-24 RX ORDER — MISOPROSTOL 200 UG/1
800 TABLET ORAL
Status: DISCONTINUED | OUTPATIENT
Start: 2020-07-24 | End: 2020-07-28 | Stop reason: HOSPADM

## 2020-07-24 RX ORDER — HYDROMORPHONE HYDROCHLORIDE 1 MG/ML
.3-.5 INJECTION, SOLUTION INTRAMUSCULAR; INTRAVENOUS; SUBCUTANEOUS EVERY 30 MIN PRN
Status: DISCONTINUED | OUTPATIENT
Start: 2020-07-24 | End: 2020-07-28 | Stop reason: HOSPADM

## 2020-07-24 RX ORDER — HYDROXYZINE HYDROCHLORIDE 50 MG/1
50-100 TABLET, FILM COATED ORAL ONCE
Status: COMPLETED | OUTPATIENT
Start: 2020-07-24 | End: 2020-07-24

## 2020-07-24 RX ORDER — LIDOCAINE 40 MG/G
CREAM TOPICAL
Status: DISCONTINUED | OUTPATIENT
Start: 2020-07-24 | End: 2020-07-28 | Stop reason: HOSPADM

## 2020-07-24 RX ORDER — DEXTROSE, SODIUM CHLORIDE, SODIUM LACTATE, POTASSIUM CHLORIDE, AND CALCIUM CHLORIDE 5; .6; .31; .03; .02 G/100ML; G/100ML; G/100ML; G/100ML; G/100ML
INJECTION, SOLUTION INTRAVENOUS CONTINUOUS
Status: DISCONTINUED | OUTPATIENT
Start: 2020-07-24 | End: 2020-07-28 | Stop reason: HOSPADM

## 2020-07-24 RX ORDER — OXYTOCIN 10 [USP'U]/ML
10 INJECTION, SOLUTION INTRAMUSCULAR; INTRAVENOUS
Status: DISCONTINUED | OUTPATIENT
Start: 2020-07-24 | End: 2020-07-25

## 2020-07-24 RX ORDER — CARBOPROST TROMETHAMINE 250 UG/ML
250 INJECTION, SOLUTION INTRAMUSCULAR
Status: DISCONTINUED | OUTPATIENT
Start: 2020-07-24 | End: 2020-07-25

## 2020-07-24 RX ORDER — NALOXONE HYDROCHLORIDE 0.4 MG/ML
.1-.4 INJECTION, SOLUTION INTRAMUSCULAR; INTRAVENOUS; SUBCUTANEOUS
Status: DISCONTINUED | OUTPATIENT
Start: 2020-07-24 | End: 2020-07-28 | Stop reason: HOSPADM

## 2020-07-24 RX ORDER — HYDROMORPHONE HYDROCHLORIDE 1 MG/ML
INJECTION, SOLUTION INTRAMUSCULAR; INTRAVENOUS; SUBCUTANEOUS
Status: DISCONTINUED
Start: 2020-07-24 | End: 2020-07-24 | Stop reason: HOSPADM

## 2020-07-24 RX ORDER — AMPICILLIN 2 G/1
2 INJECTION, POWDER, FOR SOLUTION INTRAVENOUS EVERY 6 HOURS
Status: DISCONTINUED | OUTPATIENT
Start: 2020-07-24 | End: 2020-07-24

## 2020-07-24 RX ORDER — TRANEXAMIC ACID 10 MG/ML
1 INJECTION, SOLUTION INTRAVENOUS EVERY 30 MIN PRN
Status: DISCONTINUED | OUTPATIENT
Start: 2020-07-24 | End: 2020-07-28 | Stop reason: HOSPADM

## 2020-07-24 RX ORDER — ONDANSETRON 4 MG/1
4 TABLET, ORALLY DISINTEGRATING ORAL EVERY 6 HOURS PRN
Status: DISCONTINUED | OUTPATIENT
Start: 2020-07-24 | End: 2020-07-25

## 2020-07-24 RX ORDER — GENTAMICIN SULFATE 60 MG/50ML
2 INJECTION, SOLUTION INTRAVENOUS ONCE
Status: COMPLETED | OUTPATIENT
Start: 2020-07-24 | End: 2020-07-24

## 2020-07-24 RX ORDER — GENTAMICIN SULFATE 100 MG/100ML
1.5 INJECTION, SOLUTION INTRAVENOUS EVERY 8 HOURS
Status: DISCONTINUED | OUTPATIENT
Start: 2020-07-24 | End: 2020-07-24

## 2020-07-24 RX ORDER — NALBUPHINE HYDROCHLORIDE 10 MG/ML
2.5-5 INJECTION, SOLUTION INTRAMUSCULAR; INTRAVENOUS; SUBCUTANEOUS EVERY 6 HOURS PRN
Status: DISCONTINUED | OUTPATIENT
Start: 2020-07-24 | End: 2020-07-25

## 2020-07-24 RX ORDER — OXYTOCIN 10 [USP'U]/ML
10 INJECTION, SOLUTION INTRAMUSCULAR; INTRAVENOUS
Status: DISCONTINUED | OUTPATIENT
Start: 2020-07-24 | End: 2020-07-28 | Stop reason: HOSPADM

## 2020-07-24 RX ORDER — BISACODYL 10 MG
10 SUPPOSITORY, RECTAL RECTAL DAILY PRN
Status: DISCONTINUED | OUTPATIENT
Start: 2020-07-26 | End: 2020-07-28 | Stop reason: HOSPADM

## 2020-07-24 RX ORDER — IBUPROFEN 400 MG/1
800 TABLET, FILM COATED ORAL
Status: DISCONTINUED | OUTPATIENT
Start: 2020-07-24 | End: 2020-07-24

## 2020-07-24 RX ORDER — SIMETHICONE 80 MG
80 TABLET,CHEWABLE ORAL 4 TIMES DAILY PRN
Status: DISCONTINUED | OUTPATIENT
Start: 2020-07-24 | End: 2020-07-28 | Stop reason: HOSPADM

## 2020-07-24 RX ORDER — GENTAMICIN SULFATE 60 MG/50ML
1.5 INJECTION, SOLUTION INTRAVENOUS EVERY 8 HOURS
Status: DISCONTINUED | OUTPATIENT
Start: 2020-07-24 | End: 2020-07-26

## 2020-07-24 RX ORDER — AMPICILLIN 2 G/1
2 INJECTION, POWDER, FOR SOLUTION INTRAVENOUS EVERY 6 HOURS
Status: DISCONTINUED | OUTPATIENT
Start: 2020-07-24 | End: 2020-07-26

## 2020-07-24 RX ORDER — ACETAMINOPHEN 325 MG/1
975 TABLET ORAL ONCE
Status: DISCONTINUED | OUTPATIENT
Start: 2020-07-24 | End: 2020-07-24

## 2020-07-24 RX ORDER — METHYLERGONOVINE MALEATE 0.2 MG/ML
200 INJECTION INTRAVENOUS
Status: DISCONTINUED | OUTPATIENT
Start: 2020-07-24 | End: 2020-07-25

## 2020-07-24 RX ORDER — OXYTOCIN/0.9 % SODIUM CHLORIDE 30/500 ML
340 PLASTIC BAG, INJECTION (ML) INTRAVENOUS CONTINUOUS PRN
Status: DISCONTINUED | OUTPATIENT
Start: 2020-07-24 | End: 2020-07-28 | Stop reason: HOSPADM

## 2020-07-24 RX ORDER — EPHEDRINE SULFATE 50 MG/ML
5 INJECTION, SOLUTION INTRAMUSCULAR; INTRAVENOUS; SUBCUTANEOUS
Status: DISCONTINUED | OUTPATIENT
Start: 2020-07-24 | End: 2020-07-25

## 2020-07-24 RX ORDER — CALCIUM CARBONATE 500 MG/1
1000 TABLET, CHEWABLE ORAL EVERY 4 HOURS PRN
Status: DISCONTINUED | OUTPATIENT
Start: 2020-07-24 | End: 2020-07-28 | Stop reason: HOSPADM

## 2020-07-24 RX ORDER — HYDROCORTISONE 2.5 %
CREAM (GRAM) TOPICAL 3 TIMES DAILY PRN
Status: DISCONTINUED | OUTPATIENT
Start: 2020-07-24 | End: 2020-07-28 | Stop reason: HOSPADM

## 2020-07-24 RX ORDER — CITRIC ACID/SODIUM CITRATE 334-500MG
30 SOLUTION, ORAL ORAL
Status: COMPLETED | OUTPATIENT
Start: 2020-07-24 | End: 2020-07-24

## 2020-07-24 RX ORDER — OXYTOCIN/0.9 % SODIUM CHLORIDE 30/500 ML
PLASTIC BAG, INJECTION (ML) INTRAVENOUS CONTINUOUS PRN
Status: DISCONTINUED | OUTPATIENT
Start: 2020-07-24 | End: 2020-07-24

## 2020-07-24 RX ORDER — ONDANSETRON 2 MG/ML
4 INJECTION INTRAMUSCULAR; INTRAVENOUS EVERY 6 HOURS PRN
Status: DISCONTINUED | OUTPATIENT
Start: 2020-07-24 | End: 2020-07-28 | Stop reason: HOSPADM

## 2020-07-24 RX ORDER — ACETAMINOPHEN 325 MG/1
650 TABLET ORAL EVERY 4 HOURS PRN
Status: DISCONTINUED | OUTPATIENT
Start: 2020-07-24 | End: 2020-07-24

## 2020-07-24 RX ORDER — OXYCODONE AND ACETAMINOPHEN 5; 325 MG/1; MG/1
1 TABLET ORAL
Status: DISCONTINUED | OUTPATIENT
Start: 2020-07-24 | End: 2020-07-24

## 2020-07-24 RX ORDER — ROPIVACAINE HYDROCHLORIDE 2 MG/ML
10 INJECTION, SOLUTION EPIDURAL; INFILTRATION; PERINEURAL ONCE
Status: COMPLETED | OUTPATIENT
Start: 2020-07-24 | End: 2020-07-24

## 2020-07-24 RX ORDER — FENTANYL CITRATE 50 UG/ML
50-100 INJECTION, SOLUTION INTRAMUSCULAR; INTRAVENOUS
Status: DISCONTINUED | OUTPATIENT
Start: 2020-07-24 | End: 2020-07-25

## 2020-07-24 RX ORDER — OXYCODONE HYDROCHLORIDE 5 MG/1
5 TABLET ORAL EVERY 4 HOURS PRN
Status: DISCONTINUED | OUTPATIENT
Start: 2020-07-24 | End: 2020-07-28 | Stop reason: HOSPADM

## 2020-07-24 RX ORDER — KETOROLAC TROMETHAMINE 30 MG/ML
INJECTION, SOLUTION INTRAMUSCULAR; INTRAVENOUS
Status: COMPLETED
Start: 2020-07-24 | End: 2020-07-24

## 2020-07-24 RX ORDER — NALOXONE HYDROCHLORIDE 0.4 MG/ML
.1-.4 INJECTION, SOLUTION INTRAMUSCULAR; INTRAVENOUS; SUBCUTANEOUS
Status: DISCONTINUED | OUTPATIENT
Start: 2020-07-24 | End: 2020-07-24

## 2020-07-24 RX ORDER — TRANEXAMIC ACID 10 MG/ML
1 INJECTION, SOLUTION INTRAVENOUS EVERY 30 MIN PRN
Status: DISCONTINUED | OUTPATIENT
Start: 2020-07-24 | End: 2020-07-24

## 2020-07-24 RX ORDER — OXYTOCIN/0.9 % SODIUM CHLORIDE 30/500 ML
100-340 PLASTIC BAG, INJECTION (ML) INTRAVENOUS CONTINUOUS PRN
Status: DISCONTINUED | OUTPATIENT
Start: 2020-07-24 | End: 2020-07-25

## 2020-07-24 RX ORDER — AMOXICILLIN 250 MG
2 CAPSULE ORAL 2 TIMES DAILY
Status: DISCONTINUED | OUTPATIENT
Start: 2020-07-24 | End: 2020-07-28 | Stop reason: HOSPADM

## 2020-07-24 RX ORDER — MORPHINE SULFATE 1 MG/ML
INJECTION, SOLUTION EPIDURAL; INTRATHECAL; INTRAVENOUS PRN
Status: DISCONTINUED | OUTPATIENT
Start: 2020-07-24 | End: 2020-07-24

## 2020-07-24 RX ORDER — LIDOCAINE HCL/EPINEPHRINE/PF 2%-1:200K
VIAL (ML) INJECTION PRN
Status: DISCONTINUED | OUTPATIENT
Start: 2020-07-24 | End: 2020-07-24

## 2020-07-24 RX ORDER — ACETAMINOPHEN 325 MG/1
650 TABLET ORAL EVERY 4 HOURS PRN
Status: DISCONTINUED | OUTPATIENT
Start: 2020-07-27 | End: 2020-07-28 | Stop reason: HOSPADM

## 2020-07-24 RX ORDER — KETOROLAC TROMETHAMINE 30 MG/ML
30 INJECTION, SOLUTION INTRAMUSCULAR; INTRAVENOUS EVERY 6 HOURS
Status: COMPLETED | OUTPATIENT
Start: 2020-07-24 | End: 2020-07-25

## 2020-07-24 RX ORDER — SODIUM CHLORIDE, SODIUM LACTATE, POTASSIUM CHLORIDE, CALCIUM CHLORIDE 600; 310; 30; 20 MG/100ML; MG/100ML; MG/100ML; MG/100ML
INJECTION, SOLUTION INTRAVENOUS CONTINUOUS
Status: DISCONTINUED | OUTPATIENT
Start: 2020-07-24 | End: 2020-07-24

## 2020-07-24 RX ORDER — OXYTOCIN/0.9 % SODIUM CHLORIDE 30/500 ML
100 PLASTIC BAG, INJECTION (ML) INTRAVENOUS CONTINUOUS
Status: DISCONTINUED | OUTPATIENT
Start: 2020-07-24 | End: 2020-07-28 | Stop reason: HOSPADM

## 2020-07-24 RX ADMIN — ACETAMINOPHEN 975 MG: 325 TABLET, FILM COATED ORAL at 22:22

## 2020-07-24 RX ADMIN — Medication 100 ML/HR: at 15:06

## 2020-07-24 RX ADMIN — AMPICILLIN SODIUM 2 G: 2 INJECTION, POWDER, FOR SOLUTION INTRAVENOUS at 22:23

## 2020-07-24 RX ADMIN — AMPICILLIN SODIUM 2 G: 2 INJECTION, POWDER, FOR SOLUTION INTRAVENOUS at 10:14

## 2020-07-24 RX ADMIN — SODIUM CITRATE AND CITRIC ACID MONOHYDRATE 30 ML: 500; 334 SOLUTION ORAL at 10:48

## 2020-07-24 RX ADMIN — MORPHINE SULFATE 2 MG: 1 INJECTION, SOLUTION EPIDURAL; INTRATHECAL; INTRAVENOUS at 11:20

## 2020-07-24 RX ADMIN — Medication 340 ML/HR: at 11:08

## 2020-07-24 RX ADMIN — Medication 12 ML/HR: at 07:45

## 2020-07-24 RX ADMIN — SODIUM CHLORIDE, POTASSIUM CHLORIDE, SODIUM LACTATE AND CALCIUM CHLORIDE 1000 ML: 600; 310; 30; 20 INJECTION, SOLUTION INTRAVENOUS at 07:30

## 2020-07-24 RX ADMIN — DOCUSATE SODIUM AND SENNOSIDES 1 TABLET: 8.6; 5 TABLET, FILM COATED ORAL at 22:22

## 2020-07-24 RX ADMIN — SODIUM CHLORIDE, POTASSIUM CHLORIDE, SODIUM LACTATE AND CALCIUM CHLORIDE: 600; 310; 30; 20 INJECTION, SOLUTION INTRAVENOUS at 10:44

## 2020-07-24 RX ADMIN — ACETAMINOPHEN 975 MG: 325 TABLET, FILM COATED ORAL at 16:16

## 2020-07-24 RX ADMIN — LIDOCAINE HYDROCHLORIDE,EPINEPHRINE BITARTRATE 5 ML: 20; .005 INJECTION, SOLUTION EPIDURAL; INFILTRATION; INTRACAUDAL; PERINEURAL at 10:48

## 2020-07-24 RX ADMIN — DEXMEDETOMIDINE HYDROCHLORIDE 8 MCG: 100 INJECTION, SOLUTION INTRAVENOUS at 11:28

## 2020-07-24 RX ADMIN — LIDOCAINE HYDROCHLORIDE,EPINEPHRINE BITARTRATE 5 ML: 20; .005 INJECTION, SOLUTION EPIDURAL; INFILTRATION; INTRACAUDAL; PERINEURAL at 10:50

## 2020-07-24 RX ADMIN — KETOROLAC TROMETHAMINE 30 MG: 30 INJECTION, SOLUTION INTRAMUSCULAR at 12:48

## 2020-07-24 RX ADMIN — GENTAMICIN SULFATE 120 MG: 60 INJECTION, SOLUTION INTRAVENOUS at 10:56

## 2020-07-24 RX ADMIN — PHENYLEPHRINE HYDROCHLORIDE 100 MCG: 10 INJECTION INTRAVENOUS at 11:25

## 2020-07-24 RX ADMIN — OXYCODONE HYDROCHLORIDE 5 MG: 5 TABLET ORAL at 17:44

## 2020-07-24 RX ADMIN — ONDANSETRON 4 MG: 2 INJECTION INTRAMUSCULAR; INTRAVENOUS at 11:17

## 2020-07-24 RX ADMIN — SODIUM CHLORIDE, POTASSIUM CHLORIDE, SODIUM LACTATE AND CALCIUM CHLORIDE: 600; 310; 30; 20 INJECTION, SOLUTION INTRAVENOUS at 11:51

## 2020-07-24 RX ADMIN — GENTAMICIN SULFATE 120 MG: 60 INJECTION, SOLUTION INTRAVENOUS at 18:17

## 2020-07-24 RX ADMIN — PHENYLEPHRINE HYDROCHLORIDE 100 MCG: 10 INJECTION INTRAVENOUS at 11:47

## 2020-07-24 RX ADMIN — LIDOCAINE HYDROCHLORIDE,EPINEPHRINE BITARTRATE 2.5 ML: 20; .005 INJECTION, SOLUTION EPIDURAL; INFILTRATION; INTRACAUDAL; PERINEURAL at 10:55

## 2020-07-24 RX ADMIN — PHENYLEPHRINE HYDROCHLORIDE 100 MCG: 10 INJECTION INTRAVENOUS at 11:12

## 2020-07-24 RX ADMIN — HYDROXYZINE HYDROCHLORIDE 100 MG: 50 TABLET, FILM COATED ORAL at 04:54

## 2020-07-24 RX ADMIN — SODIUM CHLORIDE, SODIUM LACTATE, POTASSIUM CHLORIDE, CALCIUM CHLORIDE AND DEXTROSE MONOHYDRATE: 5; 600; 310; 30; 20 INJECTION, SOLUTION INTRAVENOUS at 20:00

## 2020-07-24 RX ADMIN — ROPIVACAINE HYDROCHLORIDE 10 ML: 2 INJECTION, SOLUTION EPIDURAL; INFILTRATION at 07:43

## 2020-07-24 RX ADMIN — CALCIUM CARBONATE (ANTACID) CHEW TAB 500 MG 1000 MG: 500 CHEW TAB at 17:43

## 2020-07-24 RX ADMIN — KETOROLAC TROMETHAMINE 30 MG: 30 INJECTION, SOLUTION INTRAMUSCULAR at 18:14

## 2020-07-24 RX ADMIN — AMPICILLIN SODIUM 2 G: 2 INJECTION, POWDER, FOR SOLUTION INTRAVENOUS at 16:15

## 2020-07-24 RX ADMIN — PHENYLEPHRINE HYDROCHLORIDE 0.5 MCG/KG/MIN: 10 INJECTION INTRAVENOUS at 10:57

## 2020-07-24 RX ADMIN — HYDROMORPHONE HYDROCHLORIDE 0.5 MG: 1 INJECTION, SOLUTION INTRAMUSCULAR; INTRAVENOUS; SUBCUTANEOUS at 13:21

## 2020-07-24 RX ADMIN — OXYCODONE HYDROCHLORIDE 5 MG: 5 TABLET ORAL at 22:22

## 2020-07-24 RX ADMIN — FENTANYL CITRATE 50 MCG: 50 INJECTION, SOLUTION INTRAMUSCULAR; INTRAVENOUS at 06:07

## 2020-07-24 RX ADMIN — ACETAMINOPHEN 975 MG: 325 TABLET, FILM COATED ORAL at 10:29

## 2020-07-24 ASSESSMENT — LIFESTYLE VARIABLES: TOBACCO_USE: 0

## 2020-07-24 ASSESSMENT — COPD QUESTIONNAIRES: COPD: 0

## 2020-07-24 NOTE — ANESTHESIA POSTPROCEDURE EVALUATION
Patient: Carolyn Cerda    Procedure(s):   SECTION    Diagnosis:Indication for care in labor or delivery [O75.9]  Diagnosis Additional Information: No value filed.    Anesthesia Type:  Epidural    Note:  Anesthesia Post Evaluation    Patient location during evaluation: OB PACU  Patient participation: Able to participate in evaluation but full recovery from regional anesthesia has not yet ocurrred but is anticipated to occur within 48 hours (Spinal wearing off)  Level of consciousness: awake and alert  Pain management: adequate  Airway patency: patent  Cardiovascular status: acceptable  Respiratory status: acceptable  Hydration status: acceptable  PONV: none     Anesthetic complications: None          Last vitals:  Vitals:    20 1430 20 1530 20 1630   BP: 103/48 114/46 115/66   Pulse: 107     Resp: 16 16 16   Temp:  36.6  C (97.8  F) 37.4  C (99.3  F)   SpO2: 98% 99% 100%         Electronically Signed By: Niko Sorenson MD  2020  5:42 PM

## 2020-07-24 NOTE — PLAN OF CARE
Primip laboring at 40+2 weeks, epidural for pain relief, maternal temp, IVABs needed,  section delivery for fetal intolerance of labor. NNP at delivery. VS and bleeding stable thru PACU, pain decreased with meds, patient into NICU to see baby prior to transfer to [facility]. Terrance GRAY RN at 1410.

## 2020-07-24 NOTE — PROGRESS NOTES
Mandy Deutsch CNM updated on meconium stained fluid noted at 0650 as well as no change to VE. Updated on bolus for epidural. Carmella Ibarra RN

## 2020-07-24 NOTE — PROGRESS NOTES
Carolyn Cerda is a 20 year old  with an IUP at 40w2d gestation. She was admitted for indication for labor with contractions and SROM.     I returned a page at 0151. She stated she had been guera every 2 min 23 sec and contractions lasting 47 sec on average according to her génesis. She had clear or pinkish mucousy discharge after using the bathroom each time. FM+. She stated her membranes were swept earlier today and she had been guera for the past few hours. She was very uncomfortable during contractions and unable to talk through them. At this time I recommended she come into the hospital. She stated her boyfriend was at work (at least an hour away). She thought that she could maybe take an Uber. I recommended that if she couldn't find anyone, then she should call an ambulance.     MAC called at 0440 to update me on her arrival 20 minutes ago. Contraction were every 2-3 minutes and she was uncomfortable - she was unable to sit still on the bed. Cervix was unchanged from previous visit. Due to discharge a ROM plus was sent. FM+ with no decelerations. Her aunt was with her and she told the nurse her boyfriend wouldn't be able to get to the hospital for another 10 hours. Plan was to observe her for an hour.    Updated by MAC at 0511. ROM plus came back positive. Admission orders placed. Plan for expectant management since contractions were regular. Start IV and offer fentanyl for pain relief. If contractions slowed down, then we would consider pitocin for augmentation.   FHR baseline: 145, accelerations present, decelerations absent.    Mandy Deutsch, DNP, APRN, CNM

## 2020-07-24 NOTE — PROGRESS NOTES
Pt brought to MAC via wheelchair accompanied by aunt with complaints of contractions.  Brought to MAC room, POC explained.  Verbal consent obtained.  EFM applied.    Pt reports contractions that increased in intensity starting around 0100.  Pt reports some blood tinged fluid but no bright red bleeding.  +FM.  Pt reports back pain, cramping and tightness at 9/10.      ROM plus obtained and sent to lab.    SVE 1-2/75/-2    Mandy Deutsch CNM notified of pt arrival, SVE, small amount of blood tinged fluid. FHR tracing in the 150s with moderate variability but no accelerations yet.     Orders received.    ROM plus positive.    SALLY Galvan updated on ROM plus positive.  Orders to admit patient to labor and delivery.    COVID swab obtained and sent to lab.    Full report to ANAMARIA Dotson.

## 2020-07-24 NOTE — ANESTHESIA PROCEDURE NOTES
Procedure note : epidural catheter  Staff -   Anesthesiologist:  Molly Walden      Performed By: anesthesiologist        Pre-Procedure  Performed by Molly Walden  Location: OB      Pre-Anesthestic Checklist: patient identified, IV checked, site marked, risks and benefits discussed, informed consent, monitors and equipment checked, pre-op evaluation, at physician/surgeon's request and post-op pain management    Timeout  Correct Patient: Yes   Correct Procedure: Yes   Correct Site: Yes   Correct Laterality: N/A   Correct Position: Yes   Site Marked: N/A   .   Procedure Documentation    .    Procedure: epidural catheter, .   Patient Position:sitting Insertion Site:L3-4  (midline approach) Injection technique: LORT saline   Local skin infiltrated with 3 mL of 1% lidocaine.  JAGDISH at 5 cm    Patient Prep/Sterile Barriers; sterile gloves, povidone-iodine 7.5% surgical scrub, patient draped.  .  Needle: Touhy needle   Needle Gauge: 18.    Needle Length (Inches) 3.5   # of attempts: 1 and # of redirects:  .    . .  Catheter threaded easily  3 cm epidural space.  8 cm at skin.   .    Assessment/Narrative  Paresthesias: No.  .  .  Aspiration negative for heme or CSF  . Test dose of mL lidocaine 1.5% w/ 1:200,000 epinephrine at. Test dose negative for signs of intravascular, subdural or intrathecal injection.

## 2020-07-24 NOTE — PROGRESS NOTES
Patient has had recurrent consistent late decelerations since 9:15 am  Midwife patient  Measures including fluid bolus position changes and nasal oxygen   Remote from delivery  Needs to proceed for c section for fetal intolerance of labor  Consent signed  Antibiotics started for chroiamnionitis  Has epidural   40w 2/7  primip

## 2020-07-24 NOTE — LACTATION NOTE
"Initial Lactation visit with Carolyn. Carolyn's infant in the NICU. Carolyn started pumping soon after birth and is planning to work on pumping every 3 hours around the clock moving forward. Carolyn set up with LC assistance to pump for second time since delivery. Discussed hands on pumping. Reviewed Medela symphony settings with Carolyn and encouraged use of initiate mode. Carolyn was able to pump about 20ml of EBM with second pump! Offered encouragement, discussed typical milk volumes in first days and expected timeline of milk supply progression over first 3-5 days postpartum. Explained benefits of holding baby skin on skin to help promote better breastfeeding outcomes. Carolyn has a pump for home use. Reviewed feeding log and milk storage guidelines pages in [facility] patient education booklet.  Encouraged watching \"Jose Maximizing Milk\" video online. Will revisit as needed.    Denisse Silva RN-C, IBCLC, MNN, PHN, BSN    "

## 2020-07-24 NOTE — PLAN OF CARE
VSS, fundus is firm at U/U, scant flow, no clots.  Incision dressing is CDI.  Pain controlled with Duramorph, Toradol, Tylenol and Oxy 5mg.  Abdominal binder in place for additional support. IV pit continues per orders,  IV antibiotics continue per orders.  Decker intact with good urine output.  Pt c/o intermittent epigastric/chest discomfort, tums given PRN and were effective especially when burping.  SO is at bedside and very supportive.  Pt is pumping for baby in NICU.  Will continue to monitor and support.

## 2020-07-24 NOTE — OP NOTE
Procedure Date: 2020      PREOPERATIVE DIAGNOSIS:  Fetal intolerance of labor and chorioamnionitis at 40-2/7th weeks.      PROCEDURE:  Primary low cervical transverse  section.      POSTOPERATIVE DIAGNOSIS:  Fetal intolerance of labor and chorioamnionitis at 40-2/7th weeks.      ANESTHESIA:  Epidural.      FINDINGS:  Female infant with a loose nuchal cord and thick meconium-stained fluid.  Apgars were 8 and 9.  Cephalic presentation.  Normal-appearing uterus, tubes and ovaries.      SURGEON:  Ernestina Conrad MD.      ASSISTANT:  Midwife Trudy Mike.  I needed MsEmmanuel Mike's assistance due to need for urgent delivery due to fetal intolerance of labor and assistance in retraction and expeditious delivery of the baby.      ESTIMATED BLOOD LOSS:  950 mL      TECHNIQUE:  The patient was taken to the operating room, put in supine position in a lateral tilt.  Her epidural was reinjected.  Fetal heart tones were checked.  The patient had recurrent persistent late decelerations prior to delivery.  The patient was prepped and draped.  Decker was in place.  A timeout procedure was carried out.        Pfannenstiel incision was made with a scalpel.  We incised the subcutaneous tissue laterally.  The fascia was divided in a transverse fashion.  The peritoneum was entered bluntly.  A bladder blade was inserted into the peritoneal cavity.  Bladder flap was taken down with sharp dissection.  A low cervical transverse incision was made in the uterus.  The baby was delivered without difficulty.  It was occiput posterior in position.  There was a very loose nuchal cord and copious amounts of thick meconium-stained fluid.  The patient had been started on antibiotics an hour prior to delivery due to chorioamnionitis.  The baby was delivered.  Cord gases were sent and are still pending.  Delayed cord clamping was done because the baby was vigorous and crying spontaneously at the time of delivery.  Cord blood was obtained.   The placenta was delivered and appeared to be intact.        The uterus was exteriorized.  It was closed in 2 layers, the first layer was closed with 3-0 Monocryl in a running fashion with locking in between and then the imbricating layer was closed with 0 Monocryl in a running fashion.  We had good control of hemostasis at that point.  The uterus was returned to the abdominal cavity.  The abdominal cavity was irrigated.  Sponge counts were correct when we closed the peritoneum.  The fascia was closed with 0 Vicryl and the skin was closed with 4-0 Vicryl in subcuticular fashion.  Steri-Strips and dressings were applied.  Sponge and needle counts were correct, and the patient went to the recovery room in stable condition.         BE LAUGHLIN MD             D: 2020   T: 2020   MT: MINOO      Name:     MARILY CHASE   MRN:      [id number]        Account:        [id number]   :      2000           Procedure Date: 2020      Document: [id number]

## 2020-07-24 NOTE — PLAN OF CARE
Patient transferred to room 403 at 1400. Patient and aunt oriented to postpartum. Patient received Toradol. Tylenol, and Dilaudid for pain with adequate pain relief. Patient started pumping, infant in NICU for chorio. Encouraged patient to call with needs/questions. Call light within reach, report given to evening RN.

## 2020-07-24 NOTE — BRIEF OP NOTE
[facility]    Brief Operative Note    Pre-operative diagnosis: Fetal intolerance of labor, chorioamnionitis  Post-operative diagnosis Same as pre-operative diagnosis    Procedure: Procedure(s):   SECTION  Surgeon: Surgeon(s) and Role:     * Ernestina Conrad MD - Primary     * Trudy Mike APRN CNM - Assisting  Anesthesia: Epidural   Estimated blood loss: 990cc  Drains: None  Specimens:   ID Type Source Tests Collected by Time Destination   1 :  Cord blood Blood OR DOCUMENTATION ONLY Ernestina Conrad MD 2020 11:09 AM    2 :  Blood, arterial Blood OR DOCUMENTATION ONLY Ernestina Conrad MD 2020 11:14 AM    3 :  Blood, venous Blood OR DOCUMENTATION ONLY Ernestina Conrad MD 2020 11:14 AM    4 :  Tissue Umbilical Cord OR DOCUMENTATION ONLY Ernestina Conrad MD 2020 11:15 AM    5 :  Placenta Placenta SPECIMEN DISCARDED Ernestina Conrad MD 2020 11:15 AM      Findings:   OP position, thick meconium fluid, normal uterus, tubes and ovaries, cephalic  female .  Complications: None.

## 2020-07-24 NOTE — ANESTHESIA PREPROCEDURE EVALUATION
Anesthesia Pre-Procedure Evaluation    Patient: Carolyn Cerda   MRN: [id number] : 2000          Preoperative Diagnosis: * No pre-op diagnosis entered *    * No procedures listed *    Past Medical History:   Diagnosis Date     Asthma      Asthma affecting pregnancy in first trimester 2020     Past Surgical History:   Procedure Laterality Date     NO HISTORY OF SURGERY         Anesthesia Evaluation     . Pt has not had prior anesthetic            ROS/MED HX    ENT/Pulmonary:     (+)asthma , . .    Neurologic:       Cardiovascular:         METS/Exercise Tolerance:     Hematologic:         Musculoskeletal:         GI/Hepatic:        (-) GERD   Renal/Genitourinary:         Endo:         Psychiatric:         Infectious Disease:         Malignancy:         Other:                          Physical Exam  Normal systems: dental    Airway   Mallampati: II  TM distance: >3 FB  Neck ROM: full    Dental     Cardiovascular   Rhythm and rate: regular and normal      Pulmonary    breath sounds clear to auscultation            Lab Results   Component Value Date    WBC 16.6 (H) 2020    HGB 12.2 2020    HCT 35.8 2020     2020     2019    POTASSIUM 3.3 (L) 2019    CHLORIDE 108 2019    CO2 22 2019    BUN 10 2019    CR 0.47 (L) 2020    GLC 90 2019    DEVIN 8.6 2019    ALBUMIN 3.7 2019    PROTTOTAL 7.5 2019    ALT 20 2020    AST 13 2020    ALKPHOS 70 2019    BILITOTAL 0.5 2019       Preop Vitals  BP Readings from Last 3 Encounters:   20 119/66   20 126/72   20 128/62    Pulse Readings from Last 3 Encounters:   19 74   19 75      Resp Readings from Last 3 Encounters:   20 16   20 16   20 16    SpO2 Readings from Last 3 Encounters:   19 100%   19 100%      Temp Readings from Last 1 Encounters:   20 36.9  C (98.5  F) (Oral)    Ht Readings from Last 1  "Encounters:   12/18/19 1.6 m (5' 3\")      Wt Readings from Last 1 Encounters:   07/23/20 81.1 kg (178 lb 12.8 oz)    Estimated body mass index is 31.67 kg/m  as calculated from the following:    Height as of 12/18/19: 1.6 m (5' 3\").    Weight as of 7/23/20: 81.1 kg (178 lb 12.8 oz).       Anesthesia Plan      History & Physical Review      ASA Status:  2 .        Plan for Epidural            Postoperative Care      Consents  Anesthetic plan, risks, benefits and alternatives discussed with:  Patient..                 Molly Walden  "

## 2020-07-24 NOTE — PROVIDER NOTIFICATION
07/24/20 1030   Comments   Comments Preop care completed. consents signed by MD, patient and RN

## 2020-07-24 NOTE — TELEPHONE ENCOUNTER
40w2d  Planning to deliver [facility]  Midwives  1st baby    Membranes stripped today    Contractions for 2 hours  Average 2min 23 sec   Duration 47 sec    Patient is out of breath and states contractions are very intense.     Does not want to come in if it is not true labor.     Page out to call patient sent to Mandy Deutsch CNM    Reason for Disposition    [1] First baby (primipara) AND [2] contractions < 6 minutes apart  AND [3] present 2 hours    Protocols used: PREGNANCY - LABOR-A-    Zoe Mathias RN on 7/24/2020 at 1:54 AM

## 2020-07-24 NOTE — PROVIDER NOTIFICATION
07/24/20 0730   Comments   Comments MDA at bedside, consent for epidural reviewed and signed by MDA, patient and RN. Patient sat up over side of bed. Time out completed and Ropivacaine 10cc vial handed off to ANALI.

## 2020-07-24 NOTE — ANESTHESIA PREPROCEDURE EVALUATION
Anesthesia Pre-Procedure Evaluation    Patient: Carolyn Cerda   MRN: [id number] : 2000          Preoperative Diagnosis: Indication for care in labor or delivery [O75.9]    Procedure(s):   SECTION    Past Medical History:   Diagnosis Date     Asthma      Asthma affecting pregnancy in first trimester 2020     Past Surgical History:   Procedure Laterality Date     NO HISTORY OF SURGERY         Anesthesia Evaluation     . Pt has had prior anesthetic. Type: General    No history of anesthetic complications          ROS/MED HX    ENT/Pulmonary:     (+)asthma , . .   (-) tobacco use and COPD   Neurologic:      (-) CVA, TIA and Neuropathy   Cardiovascular:        (-) hypertension, CAD, irregular heartbeat/palpitations and stent   METS/Exercise Tolerance:     Hematologic:        (-) anemia   Musculoskeletal:         GI/Hepatic:        (-) GERD and liver disease   Renal/Genitourinary:      (-) renal disease   Endo:      (-) Type I DM, Type II DM and thyroid disease   Psychiatric:         Infectious Disease:  - neg infectious disease ROS       Malignancy:         Other:                          Physical Exam  Normal systems: cardiovascular, pulmonary and dental    Airway   Mallampati: III  TM distance: >3 FB  Neck ROM: full    Dental     Cardiovascular       Pulmonary             Lab Results   Component Value Date    WBC 16.6 (H) 2020    HGB 12.2 2020    HCT 35.8 2020     2020     2019    POTASSIUM 3.3 (L) 2019    CHLORIDE 108 2019    CO2 22 2019    BUN 10 2019    CR 0.47 (L) 2020    GLC 90 2019    DEVIN 8.6 2019    ALBUMIN 3.7 2019    PROTTOTAL 7.5 2019    ALT 20 2020    AST 13 2020    ALKPHOS 70 2019    BILITOTAL 0.5 2019       Preop Vitals  BP Readings from Last 3 Encounters:   20 115/66   20 126/72   20 128/62    Pulse Readings from Last 3 Encounters:   20 107  "  08/30/19 74   08/28/19 75      Resp Readings from Last 3 Encounters:   07/24/20 16   07/18/20 16   05/26/20 16    SpO2 Readings from Last 3 Encounters:   07/24/20 100%   08/31/19 100%   08/28/19 100%      Temp Readings from Last 1 Encounters:   07/24/20 37.4  C (99.3  F) (Oral)    Ht Readings from Last 1 Encounters:   12/18/19 1.6 m (5' 3\")      Wt Readings from Last 1 Encounters:   07/23/20 81.1 kg (178 lb 12.8 oz)    Estimated body mass index is 31.67 kg/m  as calculated from the following:    Height as of 12/18/19: 1.6 m (5' 3\").    Weight as of 7/23/20: 81.1 kg (178 lb 12.8 oz).       Anesthesia Plan      History & Physical Review  History and physical reviewed and following examination; no interval change.    ASA Status:  2 .    NPO Status:  > 6 hours    Plan for Epidural   PONV prophylaxis:  Ondansetron (or other 5HT-3)         Postoperative Care  Postoperative pain management:  IV analgesics and Oral pain medications.      Consents  Anesthetic plan, risks, benefits and alternatives discussed with:  Patient..                 Niko Sorenson MD  "

## 2020-07-24 NOTE — PROGRESS NOTES
[facility] PROGRESS NOTE    SUBJECTIVE:  Carolyn has been resting since getting her epidural.  Due to recurrent decelerations, resuscitative measures started at 0930.      OBJECTIVE:  /51   Temp 101.7  F (38.7  C) (Temporal)   Resp 16   LMP  (LMP Unknown)   SpO2 97%     Fetal heart tones: Baseline 170   Variability: moderate  Accelerations: absent  Decelerations: present, recurrent lates    Contractions: Pt is guera every 2-4 minutes, lasting 40-80 seconds and palpates moderate    Cervix: deferred  ROM: light meconium fluid     Pitocin- none  Antibiotics- Ampicillin and Gentamycin  Cervical ripening: N/A    ASSESSMENT:  IUP @ 40w2d early labor, fetal intolerance of labor  Maternal fever  Suspected Triple I  GBS- negative  COVID neg     PLAN:   Recurrent decels continued after 30 min of resuscitative measures.    Discussed with Dr. Conrad, plan for evaluate for operative delivery  Prepare for  section    PORSHA Mccarthy CNM

## 2020-07-24 NOTE — H&P
CNM Labor Admission History & Physical    Carolyn Cerda is a 20 year old  with an IUP at 40w2d  ; single,   Partner/support Person: aunt  Language Barrier: English  [facility]  Provider: [facility]    Carolyn Cerda is admitted to the [facility] on 2020 at 8:56 AM       History of present inllness/Chief Complaint:    Here with: spontaneous onset of labor and spontaneous rupture of membranes   Presented regarding contractions q 2-3 minutes and labor pain, was given PO Vistaril and Fentanyl for discomfort, then had SROM for clear fluid at 0100.    Patient reports contractions are Regular           Baby active Yes  Membranes are ruptured since 0100 and verified with positive ROM plus.  Bloody show No   Any changes with medical history since last prenatal visit No      Obstetrical history  Estimated Date of Delivery: 2020 determined by 8w US  No LMP recorded (lmp unknown). Patient is pregnant.   Dating U/S: 2019  Fetal anatomic survey: anatomy WNL bilateral kidney dilation resolved at 29 weeks  Placenta: posterior    PRENATAL COURSE  Prenatal care began at 9 wks gestation for a total of 15 prenatal visits.  Total wt gain 38lb; There is no height or weight on file to calculate BMI.  Prenatal Blood Pressure: WNL  Prenatal course was   complicated by    Patient Active Problem List    Diagnosis Date Noted     Indication for care in labor or delivery 2020     Priority: Medium     Fetal pyelectasis on anatomy ultrasound 2020     Priority: Medium     Lt kidney dilated 5mm, rt kidney dilated 4 mm  Follow up ultrasound at 32-34w gestation.        Cyst of left ovary 2020     Priority: Medium     Asthma complicating pregnancy, antepartum 2020     Priority: Medium     High-risk pregnancy in third trimester 2019     Priority: Medium     [facility] CNM pt  FOB: Neftali    RAZA: 2020  O+/posterior/Surprise!  Genetic:dec      Tdap:           Flu:   20      GBS:     US at 24 wk- kidneys slightly dilated 3mm, @32-34w for fetal pyelectasis and RAEANN level.   1 hr 95/hgb 10.1 PO iron              Tdap: 2020  Rhogam: NA, O+    Patient Active Problem List   Diagnosis     High-risk pregnancy in third trimester     Asthma complicating pregnancy, antepartum     Cyst of left ovary     Fetal pyelectasis on anatomy ultrasound     Indication for care in labor or delivery       HISTORY  Allergies   Allergen Reactions     Fig Extract [Ficus] Anaphylaxis     fig     Past Medical History:   Diagnosis Date     Asthma      Asthma affecting pregnancy in first trimester 2020     Past Surgical History:   Procedure Laterality Date     NO HISTORY OF SURGERY       Family History   Problem Relation Age of Onset     Lupus Mother      No Known Problems Father      No Known Problems Sister      No Known Problems Brother      No Known Problems Maternal Grandmother      No Known Problems Maternal Grandfather      No Known Problems Paternal Grandmother      No Known Problems Other      Social History     Tobacco Use     Smoking status: Never Smoker     Smokeless tobacco: Never Used   Substance Use Topics     Alcohol use: Never     Frequency: Never     OB History    Para Term  AB Living   2 0 0 0 1 0   SAB TAB Ectopic Multiple Live Births   1 0 0 0 0      # Outcome Date GA Lbr Skinny/2nd Weight Sex Delivery Anes PTL Lv   2 Current            1 SAB 2019               LABS:  Lab Results   Component Value Date    ABO O 2020    RH Pos 2020    AS Neg 2020    HGB 12.2 2020    HEPBANG Nonreactive 2019    CHPCRT Negative 2019    GCPCRT Negative 2019       GBS Status:   Lab Results   Component Value Date    GBS Negative 2020     Rubella: Immune    HIV: Non-Reactive   Platelets:  195  1hr GCT:  95    ROS   Pt is alert and oriented  Pt denies significant constitutional symptoms including fever and/or malaise.    Pt  denies significant respiratory, cardiovacular, GI, or muscular/skeletal complaints.    Neuro: Denies HA and visual changes  Muscoloskeletal: Denies except for discomforts r/t pregnancy     PHYSICAL EXAM:  BP (!) 87/45   Temp 100.6  F (38.1  C) (Temporal)   Resp 16   LMP  (LMP Unknown)   SpO2 97%   General appearance:  healthy, alert and no distress not that epidural is in place  Heart: RRR  Lungs: CTA bilaterally, normal respiratory effort  Abdomen: gravid, single vertex fetus, non-tender, EFW 8 lbs.   Legs:  1+ bilateral edema     Contractions: Pt is guera every 1-4 minutes, lasting 60-80 seconds and palpates moderate to strong    Fetal heart tones: Baseline 160   Variability: moderate   Accelerations: absent  Decelerations: present, recurrent lates    NST: non-reactive    Cervix: 3.5/ 90% / Mid/ soft/ -1, Vtx, caput  Bloody show: no  Membranes:  Ruptured x 8 hrs, fluid now meconium    ASSESSMENT:  20 year old  with phillips IUP 40w2d in early labor  Now has fever 101.7 temporal  NST non-reactive  GBS negative and membranes ruptured for 8 hours  COVID neg    PLAN:  Routine CNM care  Labs ordered: CBC, syphilis screen, type and screen  Teaching done r/t comfort measures, pain management options, and labor processes, and rususcitative measures for late decelerations  Admit - see IP orders  Pain medication-has epidural  MD consultant on call/ available prn    PORSHA Mccarthy CNM

## 2020-07-24 NOTE — ANESTHESIA CARE TRANSFER NOTE
Patient: Carolyn Cerda    Procedure(s):   SECTION    Diagnosis: Indication for care in labor or delivery [O75.9]  Diagnosis Additional Information: No value filed.    Anesthesia Type:   No value filed.     Note:  Airway :Room Air  Patient transferred to:Labor and Delivery  Comments: Transferred to OB PACU recovery, spontaneous respirations on room air with oxygen saturations maintained greater than 98%. SpO2, NiBP, and EKG monitors and alarms on and functioning, report on patient's clinical status given to OB recovery RN, RN questions answered, patient in hospital bed with siderails up, Oxygen tubing connected to wall O2 in OB PACU Oxytocin 30 units in 500mL infusion connected to IV infusion pump in recovery bay and programmed to 100 mL/hr at handoff of care.  Handoff Report: Identifed the Patient, Identified the Reponsible Provider, Reviewed the pertinent medical history, Discussed the surgical course, Reviewed Intra-OP anesthesia mangement and issues during anesthesia, Set expectations for post-procedure period and Allowed opportunity for questions and acknowledgement of understanding      Vitals: (Last set prior to Anesthesia Care Transfer)    CRNA VITALS  2020 1111 - 2020 1150      2020             Resp Rate (set):  10                Electronically Signed By: PORSHA Murphy CRNA  2020  11:50 AM

## 2020-07-25 LAB
BASOPHILS # BLD AUTO: 0 10E9/L (ref 0–0.2)
BASOPHILS NFR BLD AUTO: 0.1 %
DIFFERENTIAL METHOD BLD: ABNORMAL
EOSINOPHIL # BLD AUTO: 0.1 10E9/L (ref 0–0.7)
EOSINOPHIL NFR BLD AUTO: 0.4 %
ERYTHROCYTE [DISTWIDTH] IN BLOOD BY AUTOMATED COUNT: 14.3 % (ref 10–15)
GLUCOSE BLDC GLUCOMTR-MCNC: 77 MG/DL (ref 70–99)
HCT VFR BLD AUTO: 25.1 % (ref 35–47)
HGB BLD-MCNC: 8.4 G/DL (ref 11.7–15.7)
IMM GRANULOCYTES # BLD: 0.1 10E9/L (ref 0–0.4)
IMM GRANULOCYTES NFR BLD: 0.4 %
LYMPHOCYTES # BLD AUTO: 1.1 10E9/L (ref 0.8–5.3)
LYMPHOCYTES NFR BLD AUTO: 6.6 %
MCH RBC QN AUTO: 28.3 PG (ref 26.5–33)
MCHC RBC AUTO-ENTMCNC: 33.5 G/DL (ref 31.5–36.5)
MCV RBC AUTO: 85 FL (ref 78–100)
MONOCYTES # BLD AUTO: 0.6 10E9/L (ref 0–1.3)
MONOCYTES NFR BLD AUTO: 3.4 %
NEUTROPHILS # BLD AUTO: 14.6 10E9/L (ref 1.6–8.3)
NEUTROPHILS NFR BLD AUTO: 89.1 %
NRBC # BLD AUTO: 0 10*3/UL
NRBC BLD AUTO-RTO: 0 /100
PLATELET # BLD AUTO: 160 10E9/L (ref 150–450)
RBC # BLD AUTO: 2.97 10E12/L (ref 3.8–5.2)
WBC # BLD AUTO: 16.4 10E9/L (ref 4–11)

## 2020-07-25 PROCEDURE — 85025 COMPLETE CBC W/AUTO DIFF WBC: CPT | Performed by: OBSTETRICS & GYNECOLOGY

## 2020-07-25 PROCEDURE — 25000132 ZZH RX MED GY IP 250 OP 250 PS 637: Performed by: OBSTETRICS & GYNECOLOGY

## 2020-07-25 PROCEDURE — 25800030 ZZH RX IP 258 OP 636: Performed by: OBSTETRICS & GYNECOLOGY

## 2020-07-25 PROCEDURE — 00000146 ZZHCL STATISTIC GLUCOSE BY METER IP

## 2020-07-25 PROCEDURE — 25800030 ZZH RX IP 258 OP 636: Performed by: ANESTHESIOLOGY

## 2020-07-25 PROCEDURE — 12000035 ZZH R&B POSTPARTUM

## 2020-07-25 PROCEDURE — 25000128 H RX IP 250 OP 636: Performed by: OBSTETRICS & GYNECOLOGY

## 2020-07-25 PROCEDURE — 36415 COLL VENOUS BLD VENIPUNCTURE: CPT | Performed by: OBSTETRICS & GYNECOLOGY

## 2020-07-25 RX ORDER — METHYLPREDNISOLONE SODIUM SUCCINATE 125 MG/2ML
125 INJECTION, POWDER, LYOPHILIZED, FOR SOLUTION INTRAMUSCULAR; INTRAVENOUS
Status: DISCONTINUED | OUTPATIENT
Start: 2020-07-25 | End: 2020-07-28 | Stop reason: HOSPADM

## 2020-07-25 RX ORDER — DIPHENHYDRAMINE HYDROCHLORIDE 50 MG/ML
50 INJECTION INTRAMUSCULAR; INTRAVENOUS
Status: DISCONTINUED | OUTPATIENT
Start: 2020-07-25 | End: 2020-07-28 | Stop reason: HOSPADM

## 2020-07-25 RX ADMIN — GENTAMICIN SULFATE 120 MG: 60 INJECTION, SOLUTION INTRAVENOUS at 18:22

## 2020-07-25 RX ADMIN — AMPICILLIN SODIUM 2 G: 2 INJECTION, POWDER, FOR SOLUTION INTRAVENOUS at 22:01

## 2020-07-25 RX ADMIN — KETOROLAC TROMETHAMINE 30 MG: 30 INJECTION, SOLUTION INTRAMUSCULAR at 06:27

## 2020-07-25 RX ADMIN — OXYCODONE HYDROCHLORIDE 5 MG: 5 TABLET ORAL at 12:36

## 2020-07-25 RX ADMIN — IBUPROFEN 800 MG: 400 TABLET ORAL at 12:37

## 2020-07-25 RX ADMIN — SIMETHICONE 80 MG: 80 TABLET, CHEWABLE ORAL at 18:21

## 2020-07-25 RX ADMIN — ACETAMINOPHEN 975 MG: 325 TABLET, FILM COATED ORAL at 22:01

## 2020-07-25 RX ADMIN — OXYCODONE HYDROCHLORIDE 5 MG: 5 TABLET ORAL at 20:29

## 2020-07-25 RX ADMIN — OXYCODONE HYDROCHLORIDE 5 MG: 5 TABLET ORAL at 04:24

## 2020-07-25 RX ADMIN — KETOROLAC TROMETHAMINE 30 MG: 30 INJECTION, SOLUTION INTRAMUSCULAR at 00:27

## 2020-07-25 RX ADMIN — IRON SUCROSE 300 MG: 20 INJECTION, SOLUTION INTRAVENOUS at 08:41

## 2020-07-25 RX ADMIN — IBUPROFEN 800 MG: 400 TABLET ORAL at 18:21

## 2020-07-25 RX ADMIN — DOCUSATE SODIUM 50 MG AND SENNOSIDES 8.6 MG 2 TABLET: 8.6; 5 TABLET, FILM COATED ORAL at 20:29

## 2020-07-25 RX ADMIN — OXYCODONE HYDROCHLORIDE 5 MG: 5 TABLET ORAL at 15:57

## 2020-07-25 RX ADMIN — GENTAMICIN SULFATE 120 MG: 60 INJECTION, SOLUTION INTRAVENOUS at 10:28

## 2020-07-25 RX ADMIN — ACETAMINOPHEN 975 MG: 325 TABLET, FILM COATED ORAL at 04:24

## 2020-07-25 RX ADMIN — GENTAMICIN SULFATE 120 MG: 60 INJECTION, SOLUTION INTRAVENOUS at 02:20

## 2020-07-25 RX ADMIN — AMPICILLIN SODIUM 2 G: 2 INJECTION, POWDER, FOR SOLUTION INTRAVENOUS at 09:50

## 2020-07-25 RX ADMIN — ACETAMINOPHEN 975 MG: 325 TABLET, FILM COATED ORAL at 09:53

## 2020-07-25 RX ADMIN — SODIUM CHLORIDE, POTASSIUM CHLORIDE, SODIUM LACTATE AND CALCIUM CHLORIDE 250 ML: 600; 310; 30; 20 INJECTION, SOLUTION INTRAVENOUS at 00:39

## 2020-07-25 RX ADMIN — AMPICILLIN SODIUM 2 G: 2 INJECTION, POWDER, FOR SOLUTION INTRAVENOUS at 15:56

## 2020-07-25 RX ADMIN — DOCUSATE SODIUM AND SENNOSIDES 1 TABLET: 8.6; 5 TABLET, FILM COATED ORAL at 07:55

## 2020-07-25 RX ADMIN — AMPICILLIN SODIUM 2 G: 2 INJECTION, POWDER, FOR SOLUTION INTRAVENOUS at 04:23

## 2020-07-25 RX ADMIN — OXYCODONE HYDROCHLORIDE 5 MG: 5 TABLET ORAL at 07:55

## 2020-07-25 RX ADMIN — ACETAMINOPHEN 975 MG: 325 TABLET, FILM COATED ORAL at 15:57

## 2020-07-25 NOTE — PLAN OF CARE
Pt had a low grade fever overnight with a temp max of 100.4.  Pt also hypotensive 90's over 40-50's. MD aware, fluid boluses administered.  Pt on 2 antibiotics, taking scheduled and as needed pain medication for pain control.  Pt did go to NICU last evening at 8 pm to see infant did pump overnight also.  Pt ambulated to bathroom and did matilde care with the assist of the nurse.  Around 4 am pt's HR alarm went off and charge nurse made it into room first and noticed pt had a HR in the 140's.  Pt was shaking at this time and complaining of pain. Vitals were taken and pt's temperature was back to 98.2 and her HR was back down to 113.  Pain medication was given and pt instructed to take slow deep breaths.  Pt did agree that she had some anxiety and hardly ever gets sick so she is worried.  Writer reassured her, partner very supportive at bedside.  0550, pt feeling much better, pain has decreased and pt has stopped shaking and is sitting up in bed trying to eat a little bit.  Will closely monitor.

## 2020-07-25 NOTE — PROGRESS NOTES
S: Pt is stable. Infant is in NICU for antibiotic          O: BP 96/46   Pulse 113   Temp 98.2  F (36.8  C) (Oral)   Resp 20   LMP  (LMP Unknown)   SpO2 97%         ABD:  Uterine fundus is firm, non-tender and at the level of the umbilicus       INC: dressing has minimal drainage noted                Hemoglobin   Date Value Ref Range Status   07/25/2020 8.4 (L) 11.7 - 15.7 g/dL Final     Comment:     Delta Verified            Lab Results   Component Value Date    RH Pos 07/24/2020       A: Post-op Day #1 s/p C/Section for fetal intolerance and chorio       On amp and gent for chorioamnionitis      Temp last night just before midnight 100.4, down since then      Some tachycardia, probably from the decrease in hgb    P: Continue Post Op Cares       Continue iv abx till tomorrow since had a spike temp last night       Start venofer for 3 doses       Decker out today

## 2020-07-25 NOTE — PROVIDER NOTIFICATION
07/25/20 0046   Provider Notification   Provider Name/Title Dr. Conrad   Method of Notification Electronic Page   Request Evaluate-Remote   Notification Reason Status Update   MD notified of pt's hypotension and  low grade fever.  Orders received to get a CBC in the am.

## 2020-07-25 NOTE — PROGRESS NOTES
CNM Courtesy Round    PPD#1  S/P C/S  Doing well, still getting IV antibiotics. Also getting IV iron transfusions.  Baby in NICU, plans to breastfeed, has used the pump.   Discharge likely on PPD#3  Will continue to provider courtesy rounds through hospital stay.     Joan STORY CNM

## 2020-07-25 NOTE — PLAN OF CARE
Vitals stable other than one low grade fever this am.  BP occasionally on the lower side- but asymptomatic.Enjoying regular diet. Oral pain medications working well. Incisional dressing dry and intact- will remove after shower this evening. Started on daily venofer today.Down to NICU to see baby by wheelchair. Tolerated well Pumping in NICU. Continues on Amp and Gent until tomorrow. Will continue to monitor.

## 2020-07-26 LAB — HGB BLD-MCNC: 7.6 G/DL (ref 11.7–15.7)

## 2020-07-26 PROCEDURE — 25800030 ZZH RX IP 258 OP 636: Performed by: OBSTETRICS & GYNECOLOGY

## 2020-07-26 PROCEDURE — 85018 HEMOGLOBIN: CPT | Performed by: OBSTETRICS & GYNECOLOGY

## 2020-07-26 PROCEDURE — 25000132 ZZH RX MED GY IP 250 OP 250 PS 637: Performed by: OBSTETRICS & GYNECOLOGY

## 2020-07-26 PROCEDURE — 25000128 H RX IP 250 OP 636: Performed by: OBSTETRICS & GYNECOLOGY

## 2020-07-26 PROCEDURE — 36415 COLL VENOUS BLD VENIPUNCTURE: CPT | Performed by: OBSTETRICS & GYNECOLOGY

## 2020-07-26 PROCEDURE — 12000035 ZZH R&B POSTPARTUM

## 2020-07-26 RX ORDER — OXYCODONE HYDROCHLORIDE 5 MG/1
5 TABLET ORAL EVERY 6 HOURS PRN
Qty: 220 TABLET | Refills: 0 | Status: SHIPPED | OUTPATIENT
Start: 2020-07-26 | End: 2020-07-28

## 2020-07-26 RX ORDER — IBUPROFEN 800 MG/1
800 TABLET, FILM COATED ORAL EVERY 6 HOURS PRN
Qty: 30 TABLET | Refills: 0 | Status: SHIPPED | OUTPATIENT
Start: 2020-07-26 | End: 2020-07-28

## 2020-07-26 RX ORDER — ACETAMINOPHEN 325 MG/1
650 TABLET ORAL EVERY 4 HOURS PRN
Qty: 30 TABLET | Refills: 0 | Status: SHIPPED | OUTPATIENT
Start: 2020-07-27 | End: 2020-07-28

## 2020-07-26 RX ORDER — AMOXICILLIN 250 MG
1 CAPSULE ORAL 2 TIMES DAILY PRN
Qty: 20 TABLET | Refills: 0 | Status: SHIPPED | OUTPATIENT
Start: 2020-07-26 | End: 2020-07-28

## 2020-07-26 RX ADMIN — ACETAMINOPHEN 975 MG: 325 TABLET, FILM COATED ORAL at 15:40

## 2020-07-26 RX ADMIN — IBUPROFEN 800 MG: 400 TABLET ORAL at 13:17

## 2020-07-26 RX ADMIN — OXYCODONE HYDROCHLORIDE 5 MG: 5 TABLET ORAL at 06:24

## 2020-07-26 RX ADMIN — SIMETHICONE 80 MG: 80 TABLET, CHEWABLE ORAL at 00:57

## 2020-07-26 RX ADMIN — IBUPROFEN 800 MG: 400 TABLET ORAL at 06:24

## 2020-07-26 RX ADMIN — OXYCODONE HYDROCHLORIDE 5 MG: 5 TABLET ORAL at 10:09

## 2020-07-26 RX ADMIN — ACETAMINOPHEN 975 MG: 325 TABLET, FILM COATED ORAL at 10:09

## 2020-07-26 RX ADMIN — DOCUSATE SODIUM 50 MG AND SENNOSIDES 8.6 MG 2 TABLET: 8.6; 5 TABLET, FILM COATED ORAL at 07:44

## 2020-07-26 RX ADMIN — SIMETHICONE 80 MG: 80 TABLET, CHEWABLE ORAL at 15:39

## 2020-07-26 RX ADMIN — OXYCODONE HYDROCHLORIDE 5 MG: 5 TABLET ORAL at 15:39

## 2020-07-26 RX ADMIN — OXYCODONE HYDROCHLORIDE 5 MG: 5 TABLET ORAL at 00:53

## 2020-07-26 RX ADMIN — AMPICILLIN SODIUM 2 G: 2 INJECTION, POWDER, FOR SOLUTION INTRAVENOUS at 04:04

## 2020-07-26 RX ADMIN — IBUPROFEN 800 MG: 400 TABLET ORAL at 22:48

## 2020-07-26 RX ADMIN — ACETAMINOPHEN 975 MG: 325 TABLET, FILM COATED ORAL at 22:48

## 2020-07-26 RX ADMIN — ACETAMINOPHEN 975 MG: 325 TABLET, FILM COATED ORAL at 04:09

## 2020-07-26 RX ADMIN — IRON SUCROSE 300 MG: 20 INJECTION, SOLUTION INTRAVENOUS at 09:26

## 2020-07-26 RX ADMIN — OXYCODONE HYDROCHLORIDE 5 MG: 5 TABLET ORAL at 22:48

## 2020-07-26 RX ADMIN — SIMETHICONE 80 MG: 80 TABLET, CHEWABLE ORAL at 22:48

## 2020-07-26 RX ADMIN — IBUPROFEN 800 MG: 400 TABLET ORAL at 00:53

## 2020-07-26 RX ADMIN — SIMETHICONE 80 MG: 80 TABLET, CHEWABLE ORAL at 06:24

## 2020-07-26 RX ADMIN — GENTAMICIN SULFATE 120 MG: 60 INJECTION, SOLUTION INTRAVENOUS at 02:32

## 2020-07-26 RX ADMIN — DOCUSATE SODIUM 50 MG AND SENNOSIDES 8.6 MG 2 TABLET: 8.6; 5 TABLET, FILM COATED ORAL at 22:48

## 2020-07-26 NOTE — PROGRESS NOTES
"CNM COURTESY VISIT    S/O: Carolyn states that she is \"feeling better\".  States that pain is under control with current medications, just completed IV Venofer; fatigue is improved and has been able to ambulate to NICU on her own.  Daughter is still in NICU with plan for 1-2 more days of admission  VSS    A:  19yo  s/p PLTCS at 40w2d d/t suspected chorioamnionitis and fetal intolerance of labor  Postop day #2  Anemia-receiving IV Venofer  Lactating mother-working on breast feeding and pumping    P:  PP/post-op cares per OB MD  Discussed option to see OB MD or CNMs for PP care, patient desires to return to CNMs for PP care  Offered 2 wk PP visit in person or virtual; Carolyn would like to have in person 2 week PP visit for incision check, mood check and hemoglobin check.  Will call office for appointment  Plan for discharge tomorrow; will continue courtesy visits until discharge.    Trudy STORY CNM    "

## 2020-07-26 NOTE — PLAN OF CARE
Slightly hypotensive in the 90s/40s overnight, patient has been asymptomatic. Vitals otherwise unremarkable. Afebrile overnight.  incision clean, dry, well-approximated -- steris in place. Fundus firm. Lochia scant. Lung sounds clear. Bowel sounds active, patient reports passing gas. Voiding without issues. Edema +3 to feet, encouraged ambulation and elevating lower extremities when in bed. Up ad stefanie. Reports having incisional pain as well as gas pain, using Tylenol/Motrin and PRN Oxy with moderate relief. Simethicone given as well. Heat packs and abdominal binder used for comfort. Pumping and visiting  in NICU. Significant other at bedside and very supportive.

## 2020-07-26 NOTE — LACTATION NOTE
Routine visit. Carolyn has been pumping and working on breastfeeding her baby in the NICU. She's concerned because she was initially pumping approx 15 mls with each pumping session but now is only pumping drops of colostrum. Her pump flanges look to be the correct size. Encouraged her to use hand expression during pumping as well. She's currently getting IV iron for her low hemoglobin. Discussed how low hemoglobin can contribute to milk supply. Recommended she continue doing skin to skin and working on nursing her baby, then pumping after each feeding while using breast massage and hand expression at least 8x/day. Napoleon appreciative of my visit. Will revisit as needed.

## 2020-07-26 NOTE — PROGRESS NOTES
S: Pt doing well. Infant is being . Pain is well controlled.    O: BP 94/44   Pulse 91   Temp 98.2  F (36.8  C) (Oral)   Resp 16   LMP  (LMP Unknown)   SpO2 99%   Breastfeeding Unknown         ABD:  Uterine fundus is firm, non-tender and at the level of the umbilicus       INC: clean/dry/intact                Hemoglobin   Date Value Ref Range Status   07/25/2020 8.4 (L) 11.7 - 15.7 g/dL Final     Comment:     Delta Verified            Lab Results   Component Value Date    RH Pos 07/24/2020       A: Post-op Day #2 s/p C/Section      Acute blood loss anemia from surgery- receiving iv venofer    P: Continue Post Op Cares      Discharge tomorrow      Blood consent signed just in case      Repeat hgb

## 2020-07-26 NOTE — PLAN OF CARE
Vitals stable. Afebrile. IV venofer today. Incision healing. Abdomen distended and uncomfortable. Encouraged to be up walking halls  more to help expel the gas. Camomile tea given. Down to NICU to see baby and pumping but only getting drops of EBM at this time.   Will contoinue to monitor.

## 2020-07-27 PROCEDURE — 25800030 ZZH RX IP 258 OP 636: Performed by: OBSTETRICS & GYNECOLOGY

## 2020-07-27 PROCEDURE — 25000132 ZZH RX MED GY IP 250 OP 250 PS 637: Performed by: OBSTETRICS & GYNECOLOGY

## 2020-07-27 PROCEDURE — 12000035 ZZH R&B POSTPARTUM

## 2020-07-27 PROCEDURE — 25000128 H RX IP 250 OP 636: Performed by: OBSTETRICS & GYNECOLOGY

## 2020-07-27 RX ADMIN — DOCUSATE SODIUM 50 MG AND SENNOSIDES 8.6 MG 2 TABLET: 8.6; 5 TABLET, FILM COATED ORAL at 09:01

## 2020-07-27 RX ADMIN — OXYCODONE HYDROCHLORIDE 5 MG: 5 TABLET ORAL at 03:05

## 2020-07-27 RX ADMIN — OXYCODONE HYDROCHLORIDE 5 MG: 5 TABLET ORAL at 16:40

## 2020-07-27 RX ADMIN — IRON SUCROSE 300 MG: 20 INJECTION, SOLUTION INTRAVENOUS at 09:01

## 2020-07-27 RX ADMIN — OXYCODONE HYDROCHLORIDE 5 MG: 5 TABLET ORAL at 09:01

## 2020-07-27 RX ADMIN — ACETAMINOPHEN 650 MG: 325 TABLET, FILM COATED ORAL at 15:43

## 2020-07-27 RX ADMIN — ACETAMINOPHEN 975 MG: 325 TABLET, FILM COATED ORAL at 10:07

## 2020-07-27 RX ADMIN — ACETAMINOPHEN 975 MG: 325 TABLET, FILM COATED ORAL at 04:17

## 2020-07-27 RX ADMIN — OXYCODONE HYDROCHLORIDE 5 MG: 5 TABLET ORAL at 20:54

## 2020-07-27 RX ADMIN — IBUPROFEN 800 MG: 400 TABLET ORAL at 21:34

## 2020-07-27 RX ADMIN — ACETAMINOPHEN 650 MG: 325 TABLET, FILM COATED ORAL at 21:34

## 2020-07-27 RX ADMIN — OXYCODONE HYDROCHLORIDE 5 MG: 5 TABLET ORAL at 12:33

## 2020-07-27 RX ADMIN — IBUPROFEN 800 MG: 400 TABLET ORAL at 15:43

## 2020-07-27 RX ADMIN — SIMETHICONE 80 MG: 80 TABLET, CHEWABLE ORAL at 09:01

## 2020-07-27 RX ADMIN — IBUPROFEN 800 MG: 400 TABLET ORAL at 10:07

## 2020-07-27 RX ADMIN — IBUPROFEN 800 MG: 400 TABLET ORAL at 04:17

## 2020-07-27 NOTE — PROGRESS NOTES
S: Pt doing well. Infant is being . Pain is not well controlled. Having gas pain and back pain.  Baby still in nicu so patient wants to stay till tomorrow    O: /76   Pulse 91   Temp 98.6  F (37  C) (Oral)   Resp 18   LMP  (LMP Unknown)   SpO2 99%   Breastfeeding Unknown         ABD:  Uterine fundus is firm, non-tender and at the level of the umbilicus       INC: clean/dry/intact                Hemoglobin   Date Value Ref Range Status   07/26/2020 7.6 (L) 11.7 - 15.7 g/dL Final            Lab Results   Component Value Date    RH Pos 07/24/2020       A: Post-op Day #3 s/p C/Section       Anemia, getting venofer    P: Continue Post Op Cares     discharge Tues     Orders done already for tomorrow except discharge order

## 2020-07-27 NOTE — PROVIDER NOTIFICATION
07/27/20 1000   Provider Notification   Provider Name/Title Dr. Conrad    Method of Notification Phone   Request Evaluate-Remote   Notification Reason Status Update   Dr. Conrad notified of patient's IV infiltrated, patient's arm slightly swollen, no redness. IV iron stopped immediately. Heat to swollen area. Order received to stop IV iron without rest of dose. Order received for patient to start oral iron twice daily after discharge, educated patient on oral iron supplement, patient states understanding.

## 2020-07-27 NOTE — PLAN OF CARE
Vitals within defined limits. Afebrile overnight.  incision clean, dry, well-approximated -- steris in place. Fundus firm. Lochia scant. Lung sounds clear. Bowel sounds active, patient reports passing gas. Voiding without issues. Edema +3 to feet, encouraged ambulation and elevating lower extremities when in bed. Up ad stefanie. Reports having incisional pain as well as gas pain, using Tylenol/Motrin and PRN Oxy with moderate relief. Simethicone given as well. Aqua Kpad and abdominal binder used for comfort. Pumping and visiting  in NICU. Significant other at bedside and very supportive.

## 2020-07-27 NOTE — PLAN OF CARE
Patient taking Tylenol, Ibuprofen, and Oxycodone for pain with adequate pain relief. Patient ambulating independently, voiding without difficulty. Patient visiting infant in NICU. Encouraged patient to call with needs/questions. Call light within reach, will continue to monitor.

## 2020-07-28 VITALS
SYSTOLIC BLOOD PRESSURE: 112 MMHG | RESPIRATION RATE: 16 BRPM | HEART RATE: 91 BPM | DIASTOLIC BLOOD PRESSURE: 60 MMHG | TEMPERATURE: 98 F | OXYGEN SATURATION: 99 %

## 2020-07-28 PROCEDURE — 25000132 ZZH RX MED GY IP 250 OP 250 PS 637: Performed by: OBSTETRICS & GYNECOLOGY

## 2020-07-28 RX ORDER — OXYCODONE HYDROCHLORIDE 5 MG/1
5 TABLET ORAL EVERY 6 HOURS PRN
Qty: 30 TABLET | Refills: 0 | Status: SHIPPED | OUTPATIENT
Start: 2020-07-28 | End: 2020-12-08

## 2020-07-28 RX ORDER — AMOXICILLIN 250 MG
1-2 CAPSULE ORAL 2 TIMES DAILY PRN
Qty: 40 TABLET | Refills: 0 | Status: SHIPPED | OUTPATIENT
Start: 2020-07-28 | End: 2020-12-08

## 2020-07-28 RX ORDER — ACETAMINOPHEN 500 MG
TABLET ORAL
Qty: 60 TABLET | Refills: 0 | Status: SHIPPED | OUTPATIENT
Start: 2020-07-28

## 2020-07-28 RX ORDER — IBUPROFEN 800 MG/1
800 TABLET, FILM COATED ORAL EVERY 6 HOURS PRN
Qty: 60 TABLET | Refills: 0 | Status: SHIPPED | OUTPATIENT
Start: 2020-07-28

## 2020-07-28 RX ADMIN — ACETAMINOPHEN 650 MG: 325 TABLET, FILM COATED ORAL at 03:34

## 2020-07-28 RX ADMIN — DOCUSATE SODIUM 50 MG AND SENNOSIDES 8.6 MG 2 TABLET: 8.6; 5 TABLET, FILM COATED ORAL at 00:49

## 2020-07-28 RX ADMIN — OXYCODONE HYDROCHLORIDE 5 MG: 5 TABLET ORAL at 04:41

## 2020-07-28 RX ADMIN — OXYCODONE HYDROCHLORIDE 5 MG: 5 TABLET ORAL at 00:48

## 2020-07-28 RX ADMIN — IBUPROFEN 800 MG: 400 TABLET ORAL at 03:34

## 2020-07-28 RX ADMIN — ACETAMINOPHEN 650 MG: 325 TABLET, FILM COATED ORAL at 08:19

## 2020-07-28 NOTE — PROGRESS NOTES
S: Pt doing well. Infant is being . Pain is controlled with current analgesics.  Medication(s) being used: acetaminophen, ibuprofen (OTC) and narcotic analgesics including oxycodone +BM.  Iv infiltrated so couldn't get venofer so taking oral iron and feeling much better today. Thought is baby should be able to d/c from nicu tomorrow.    O: /60   Pulse 91   Temp 98  F (36.7  C) (Oral)   Resp 16   LMP  (LMP Unknown)   SpO2 99%   Breastfeeding Unknown         ABD:  Uterine fundus is firm, non-tender and at the level of the umbilicus       INC: clean/dry/intact                Hemoglobin   Date Value Ref Range Status   07/26/2020 7.6 (L) 11.7 - 15.7 g/dL Final            Lab Results   Component Value Date    RH Pos 07/24/2020       A: Post-op Day #4 s/p C/Section    P: Continue Post Op Cares  discharge home and f/u with CNM in 2 weeks and 4 weeks

## 2020-07-28 NOTE — PLAN OF CARE
Vitals stable. Feels well.but tired. Incision healing. Pain controlled.Going to NICU to see baby. Pumping regularly.Ready for discharge home today.      1030- Notified by Phamacy that patient had duplicate script with Tylenol  dosage and amount adjusted by Dr Pritchard today.. Tried to contact Dr Pritchard by beeper and phone- no response. Dr Jimenez( the on call) called and notified at 1050 of my  error and is fine with her having the original prescription from Dr Conrad which was  filled  on Sunday.   Will have pharmacy fill Iron prescription and give to patient this evening (as she was already discharged, prior to error being found), when she returns to see baby.

## 2020-07-28 NOTE — PLAN OF CARE
Vital signs stable. Up ab stefanie. Voiding and stooling. Pain controlled with ibuprofen and tylenol q 6 hours, oxycodone q 4 hours. Incision clean, dry, intact. Fundus firm. FOB in room. Visits NICU for feedings. Questions and concerns addressed.

## 2020-08-11 NOTE — PROGRESS NOTES
"Midwife Postpartum 3 Week Visit    Carolyn Cerda is a 20 year old here for a postpartum checkup.     Delivery date was 2020. She had a  and  of a viable girl, weight 6 pounds 4.5 oz., with none complications      Since delivery, she has been pumping and feeding. Pumping 3 times a day getting about 7 ounces each time. Baby is only eating 2 ounces. Baby's weight has only increased slightly from birth weight. Has been getting weight checks with the pediatrician.  She has not had any signs of infection, still having some vaginal bleeding. Some days more bleeding than other days.  Sides of C/S incision are \"hurting a little.\" Also having \"zaps\" in lower back.      She is voiding and having bowel movements without difficulty. Struggles with gas and some constipation. Is currently taking oral iron      Contraception was discussed and patient desires unsure.   She has not had intercourse since delivery.   She complains of no perineal discomfort.     Mood is Stable. Wakes every 2-3 hours for baby.   Patient screened for postpartum depression.   Depression Rating was:   Last PHQ-9 score on record = No flowsheet data found.  Last GAD7 score on record = No flowsheet data found.      ROS:  None, other than what is listed above in the HPI      Current Outpatient Medications:      acetaminophen (TYLENOL) 500 MG tablet, Take 2 tabs po q 6hrs prn (Patient not taking: Reported on 2020), Disp: 60 tablet, Rfl: 0     albuterol (PROAIR HFA/PROVENTIL HFA/VENTOLIN HFA) 108 (90 Base) MCG/ACT inhaler, Inhale 2 puffs into the lungs every 6 hours (Patient not taking: Reported on 2020), Disp: 1 Inhaler, Rfl: 0     ferrous gluconate (FERGON) 324 (38 Fe) MG tablet, Take 1 tablet (324 mg) by mouth daily (with breakfast) (Patient not taking: Reported on 2020), Disp: 90 tablet, Rfl: 1     ferrous sulfate (SLO-FE) 142 (45 Fe) MG CR tablet, Take 1 tablet (142 mg) by mouth daily (Patient not taking: Reported on " 2020), Disp: 30 tablet, Rfl: 0     ibuprofen (ADVIL/MOTRIN) 800 MG tablet, Take 1 tablet (800 mg) by mouth every 6 hours as needed for other (cramping) (Patient not taking: Reported on 2020), Disp: 60 tablet, Rfl: 0     order for DME, Equipment being ordered: double electric breast pump (Patient not taking: Reported on 2020), Disp: 1 Device, Rfl: 0     oxyCODONE (ROXICODONE) 5 MG tablet, Take 1 tablet (5 mg) by mouth every 6 hours as needed for pain (Patient not taking: Reported on 2020), Disp: 30 tablet, Rfl: 0     Prenatal Vit-Fe Fumarate-FA (PRENATAL MULTIVITAMIN W/IRON) 27-0.8 MG tablet, Take 1 tablet by mouth daily, Disp: , Rfl:      senna-docusate (SENOKOT-S/PERICOLACE) 8.6-50 MG tablet, Take 1-2 tablets by mouth 2 times daily as needed for constipation (Patient not taking: Reported on 2020), Disp: 40 tablet, Rfl: 0     VITAMIN D PO, , Disp: , Rfl: .   OB History    Para Term  AB Living   2 1 1 0 1 1   SAB TAB Ectopic Multiple Live Births   1 0 0 0 1      # Outcome Date GA Lbr Skinny/2nd Weight Sex Delivery Anes PTL Lv   2 Term 20 40w2d  2.85 kg (6 lb 4.5 oz) F    AMANDA      Name: SALVATORE,FEMALE-MARILY      Apgar1: 8  Apgar5: 9   1 SAB 2019             Last pap:    Lab Results   Component Value Date    PAP NIL 2019     Hgb in hospital was 7.6    EXAM:  /58   Wt 72.1 kg (159 lb)   LMP  (LMP Unknown)   Breastfeeding Yes   BMI 28.17 kg/m    BMI: Body mass index is 28.17 kg/m .  Exam:  Constitutional: healthy, alert, no distress, cooperative and smiling  Abdomen: C/S incision with steri-strips. Healing well. No s/sx of infection    PELVIC EXAM:  Deferred    ASSESSMENT:   Normal postpartum exam after  and c/s for abnormal FHT.    ICD-10-CM    1. Other iron deficiency anemia  D50.8 Hemoglobin   2. Lactating mother  Z39.1    3. H/O  section  Z98.891    4. Routine postpartum follow-up  Z39.2          PLAN:  No results found for any visits on  08/12/20.    Teaching: Breastfeeding strategies. Discussed increasing frequency of pumping and/or beginning to put baby to the breast to increase her weight. Counseled on supply/demand.   Continue a multivitamin/prenatal supplement, especially if breastfeeding.  Postpartum Hgb was done today. If significant increase, may discontinue oral iron regimen  RTC in 3 weeks for routine PP visit    Joan STORY CNM

## 2020-08-12 ENCOUNTER — PRENATAL OFFICE VISIT (OUTPATIENT)
Dept: MIDWIFE SERVICES | Facility: CLINIC | Age: 20
End: 2020-08-12
Payer: COMMERCIAL

## 2020-08-12 VITALS — DIASTOLIC BLOOD PRESSURE: 58 MMHG | BODY MASS INDEX: 28.17 KG/M2 | WEIGHT: 159 LBS | SYSTOLIC BLOOD PRESSURE: 100 MMHG

## 2020-08-12 DIAGNOSIS — D50.8 OTHER IRON DEFICIENCY ANEMIA: Primary | ICD-10-CM

## 2020-08-12 DIAGNOSIS — Z98.891 H/O CESAREAN SECTION: ICD-10-CM

## 2020-08-12 LAB — HGB BLD-MCNC: 12.1 G/DL (ref 11.7–15.7)

## 2020-08-12 PROCEDURE — 99207 ZZC POST PARTUM EXAM: CPT | Performed by: ADVANCED PRACTICE MIDWIFE

## 2020-08-12 PROCEDURE — 36415 COLL VENOUS BLD VENIPUNCTURE: CPT | Performed by: ADVANCED PRACTICE MIDWIFE

## 2020-08-12 PROCEDURE — 85018 HEMOGLOBIN: CPT | Performed by: ADVANCED PRACTICE MIDWIFE

## 2020-09-15 NOTE — DISCHARGE SUMMARY
Admit Date:     2020   Discharge Date:     2020      HOSPITAL COURSE:  This patient was a  2, para 0-0-1-0 at 40 weeks and 2/7 days.  She had prenatal care with our midwife service.  She presented to the hospital in active labor after spontaneous rupture of membranes.  The patient was followed by our midwives in labor, but she eventually developed persistent recurrent late decelerations and I was consulted at that time.  Decision was made. The baby was unable to tolerate further labor and she was remote from delivery.  She underwent a primary low transverse  for fetal intolerance of labor.  At the time of surgery, she was found to have a female infant in occiput posterior position with thick meconium-stained fluid.  The baby was delivered and ultimately went to the NICU postoperatively and mother had acute blood loss anemia with a hemoglobin of 7.6.  She was treated with IV Venofer, discharged home on  to follow up with Midwife Service for a postpartum checkup in 2 weeks.         BE LAUGHLIN MD             D: 09/15/2020   T: 09/15/2020   MT: GUI      Name:     MARILY CHASE   MRN:      [id number]        Account:        [id number]   :      2000           Admit Date:     2020                                  Discharge Date: 2020      Document: [id number]

## 2020-10-07 ENCOUNTER — PRENATAL OFFICE VISIT (OUTPATIENT)
Dept: MIDWIFE SERVICES | Facility: CLINIC | Age: 20
End: 2020-10-07
Payer: COMMERCIAL

## 2020-10-07 VITALS
WEIGHT: 146 LBS | BODY MASS INDEX: 25.86 KG/M2 | DIASTOLIC BLOOD PRESSURE: 64 MMHG | HEART RATE: 73 BPM | SYSTOLIC BLOOD PRESSURE: 112 MMHG | OXYGEN SATURATION: 97 %

## 2020-10-07 DIAGNOSIS — F41.8 POSTPARTUM ANXIETY: ICD-10-CM

## 2020-10-07 DIAGNOSIS — N89.8 VAGINAL DISCHARGE: Primary | ICD-10-CM

## 2020-10-07 DIAGNOSIS — N76.0 BACTERIAL VAGINOSIS: ICD-10-CM

## 2020-10-07 DIAGNOSIS — B96.89 BACTERIAL VAGINOSIS: ICD-10-CM

## 2020-10-07 DIAGNOSIS — Z30.012 EMERGENCY CONTRACEPTION: ICD-10-CM

## 2020-10-07 LAB
GRAM STN SPEC: ABNORMAL
Lab: ABNORMAL
SPECIMEN SOURCE: ABNORMAL
SPECIMEN SOURCE: ABNORMAL
WET PREP SPEC: ABNORMAL

## 2020-10-07 PROCEDURE — 87210 SMEAR WET MOUNT SALINE/INK: CPT | Performed by: ADVANCED PRACTICE MIDWIFE

## 2020-10-07 PROCEDURE — 87205 SMEAR GRAM STAIN: CPT | Performed by: ADVANCED PRACTICE MIDWIFE

## 2020-10-07 PROCEDURE — 87591 N.GONORRHOEAE DNA AMP PROB: CPT | Performed by: ADVANCED PRACTICE MIDWIFE

## 2020-10-07 PROCEDURE — 99214 OFFICE O/P EST MOD 30 MIN: CPT | Performed by: ADVANCED PRACTICE MIDWIFE

## 2020-10-07 PROCEDURE — 87491 CHLMYD TRACH DNA AMP PROBE: CPT | Performed by: ADVANCED PRACTICE MIDWIFE

## 2020-10-07 PROCEDURE — 87102 FUNGUS ISOLATION CULTURE: CPT | Performed by: ADVANCED PRACTICE MIDWIFE

## 2020-10-07 RX ORDER — METRONIDAZOLE 500 MG/1
500 TABLET ORAL 2 TIMES DAILY
Qty: 14 TABLET | Refills: 0 | Status: SHIPPED | OUTPATIENT
Start: 2020-10-07 | End: 2020-10-14

## 2020-10-07 RX ORDER — LEVONORGESTREL 1.5 MG/1
1.5 TABLET ORAL ONCE
Qty: 1 TABLET | Refills: 0 | Status: SHIPPED | OUTPATIENT
Start: 2020-10-07 | End: 2020-12-08

## 2020-10-07 ASSESSMENT — ANXIETY QUESTIONNAIRES
GAD7 TOTAL SCORE: 17
1. FEELING NERVOUS, ANXIOUS, OR ON EDGE: NEARLY EVERY DAY
5. BEING SO RESTLESS THAT IT IS HARD TO SIT STILL: SEVERAL DAYS
6. BECOMING EASILY ANNOYED OR IRRITABLE: NEARLY EVERY DAY
3. WORRYING TOO MUCH ABOUT DIFFERENT THINGS: MORE THAN HALF THE DAYS
IF YOU CHECKED OFF ANY PROBLEMS ON THIS QUESTIONNAIRE, HOW DIFFICULT HAVE THESE PROBLEMS MADE IT FOR YOU TO DO YOUR WORK, TAKE CARE OF THINGS AT HOME, OR GET ALONG WITH OTHER PEOPLE: EXTREMELY DIFFICULT
7. FEELING AFRAID AS IF SOMETHING AWFUL MIGHT HAPPEN: MORE THAN HALF THE DAYS
2. NOT BEING ABLE TO STOP OR CONTROL WORRYING: NEARLY EVERY DAY

## 2020-10-07 ASSESSMENT — PATIENT HEALTH QUESTIONNAIRE - PHQ9
SUM OF ALL RESPONSES TO PHQ QUESTIONS 1-9: 14
5. POOR APPETITE OR OVEREATING: NEARLY EVERY DAY

## 2020-10-07 NOTE — PROGRESS NOTES
"  SUBJECTIVE:                                                   Carolyn Cerda is a 20 year old who presents to clinic today for the following health issue(s):  Patient presents with:  Postpartum Care: Delivered 2020      HPI: Here today for a variety of concerns but main concern is concerned for a vaginal infection and infection at  site. She had a c/s for fetal intolerance on . Her postpartum course thus far has been uncomplicated    1. Vaginal discharge: yellow discharge that started yesterday. Denies vaginal itching or irritation, foul odor. Denies vaginal bleeding. Has not gotten period back since stopping breastfeeding one month ago.     2. Pain at  site: is worried she may have an infection at her  site because she notices occasional mild pain on the right lateral most portion of her incision. Denies redness, itching, swelling or abnormal drainage from incision site.     3. Anxiety: Carolyn states she feels like her anxiety has increased since baby was born. She reports very little support from her partner, he works a lot and then is gone frequently even when he is not working. She reports doing all of the baby cares day/night. Her family lives in [place], she has one friend in [place]. She is tearful the office today and is expressing signficant frustration with her significant other, stating he never helps her and she is unsure if she even wants to be in a relationship with him anymore. PHQ9 and GAD7 scores are elevated. She denies thoughts of self harm or suicide. Denies thoughts of hurting baby or others. She has done counseling in the past after she had a miscarriage. She states the therapist was \"kind of helpful\" but they told her she should see a psychiatrist, gave her card/number to call but she never followed up.     4. Contraception: is not currently using anything for contraception. Has done OCPs in the past but does not want to those right now, is interested in " longer acting methods.     No LMP recorded (lmp unknown).  Menstrual History: irregular since delivery   Patient is sexually active  .  Using none for contraception.   Health maintenance updated:  no  STI infx testing offered:  Accepted    Last PHQ-9 score on record =   PHQ-9 SCORE 10/7/2020   PHQ-9 Total Score 14     Last GAD7 score on record =   ADELINE-7 SCORE 10/7/2020   Total Score 17     Alcohol Score = 0    Problem list and histories reviewed & adjusted, as indicated.  Additional history: as documented.    Patient Active Problem List   Diagnosis     High-risk pregnancy in third trimester     Asthma complicating pregnancy, antepartum     Cyst of left ovary     Fetal pyelectasis on anatomy ultrasound     Indication for care in labor or delivery     Past Surgical History:   Procedure Laterality Date      SECTION N/A 2020    Procedure:  SECTION;  Surgeon: Ernestina Conrad MD;  Location:  L+D     NO HISTORY OF SURGERY        Social History     Tobacco Use     Smoking status: Never Smoker     Smokeless tobacco: Never Used   Substance Use Topics     Alcohol use: Never     Frequency: Never      Problem (# of Occurrences) Relation (Name,Age of Onset)    Lupus (1) Mother    No Known Problems (7) Father, Sister, Brother, Maternal Grandmother, Maternal Grandfather, Paternal Grandmother, Other            Current Outpatient Medications   Medication Sig     albuterol (PROAIR HFA/PROVENTIL HFA/VENTOLIN HFA) 108 (90 Base) MCG/ACT inhaler Inhale 2 puffs into the lungs every 6 hours     levonorgestrel (PLAN B) 1.5 MG tablet Take 1 tablet (1.5 mg) by mouth once for 1 dose     metroNIDAZOLE (FLAGYL) 500 MG tablet Take 1 tablet (500 mg) by mouth 2 times daily for 7 days     Prenatal Vit-Fe Fumarate-FA (PRENATAL MULTIVITAMIN W/IRON) 27-0.8 MG tablet Take 1 tablet by mouth daily     VITAMIN D PO      acetaminophen (TYLENOL) 500 MG tablet Take 2 tabs po q 6hrs prn (Patient not taking: Reported on 2020)      ferrous gluconate (FERGON) 324 (38 Fe) MG tablet Take 1 tablet (324 mg) by mouth daily (with breakfast) (Patient not taking: Reported on 2020)     ferrous sulfate (SLO-FE) 142 (45 Fe) MG CR tablet Take 1 tablet (142 mg) by mouth daily (Patient not taking: Reported on 2020)     ibuprofen (ADVIL/MOTRIN) 800 MG tablet Take 1 tablet (800 mg) by mouth every 6 hours as needed for other (cramping) (Patient not taking: Reported on 2020)     order for DME Equipment being ordered: double electric breast pump (Patient not taking: Reported on 2020)     oxyCODONE (ROXICODONE) 5 MG tablet Take 1 tablet (5 mg) by mouth every 6 hours as needed for pain (Patient not taking: Reported on 2020)     senna-docusate (SENOKOT-S/PERICOLACE) 8.6-50 MG tablet Take 1-2 tablets by mouth 2 times daily as needed for constipation (Patient not taking: Reported on 2020)     No current facility-administered medications for this visit.      Allergies   Allergen Reactions     Fig Extract [Ficus] Anaphylaxis     fig       ROS:  12 point review of systems negative other than symptoms noted below or in the HPI.  Genitourinary: Vaginal Discharge  Psychiatric: Anxiety    OBJECTIVE:     /64 (BP Location: Right arm, Patient Position: Sitting, Cuff Size: Adult Regular)   Pulse 73   Wt 66.2 kg (146 lb)   LMP  (LMP Unknown)   SpO2 97%   BMI 25.86 kg/m    Body mass index is 25.86 kg/m .    PHYSICAL EXAM:  Constitutional:  Appearance: Well nourished, well developed alert, tearful during visit   Chest:  Respiratory Effort:  Breathing unlabored. Clear to auscultation bilaterally.   Gastrointestinal:  Abdominal Examination:  Abdomen nontender to palpation,  incision is clean, dry, well-healed,no redness, non-tender,   PELVIC EXAM:  Vulva: No external lesions, BUS WNL  Vagina: Moist, pink, discharge normal  well rugated, no lesions. Wet prep, gram stain, yeast culture, gc/ct collected  Cervix: smooth,  neg  CMT  Uterus: Normal size, anteverted, non-tender, mobile  Ovaries: No mass, non-tender  Rectal exam: deferred    In-Clinic Test Results:  Results for orders placed or performed in visit on 10/07/20 (from the past 24 hour(s))   Wet prep    Specimen: Vagina   Result Value Ref Range    Specimen Description Vagina     Wet Prep No Trichomonas seen     Wet Prep Clue cells seen  Rare   (A)     Wet Prep No yeast seen     Wet Prep No WBC's seen    Gram stain    Specimen: Vagina   Result Value Ref Range    Specimen Description Vagina     Special Requests Specimen collected in eSwab transport (white cap)     Gram Stain PENDING    Yeast culture    Specimen: Vagina   Result Value Ref Range    Specimen Description Vagina     Special Requests Specimen collected in eSwab transport (white cap)     Culture Micro PENDING        ASSESSMENT/PLAN:                                                        ICD-10-CM    1. Vaginal discharge  N89.8 Wet prep     Gram stain     Yeast culture     Chlamydia trachomatis PCR     Neisseria gonorrhoeae PCR     MENTAL HEALTH REFERRAL  - Adult; Outpatient Treatment, Psychiatry; Individual/Couples/Family/Group Therapy/Health Psychology; [facility]: [facility] [phone]; We will contact you to schedule the appointment or please call with any ...   2. Emergency contraception  Z78.9 levonorgestrel (PLAN B) 1.5 MG tablet     MENTAL HEALTH REFERRAL  - Adult; Outpatient Treatment, Psychiatry; Individual/Couples/Family/Group Therapy/Health Psychology; [facility]: [facility] [phone]; We will contact you to schedule the appointment or please call with any ...   3. Postpartum depression  O99.345 MENTAL HEALTH REFERRAL  - Adult; Outpatient Treatment, Psychiatry; Individual/Couples/Family/Group Therapy/Health Psychology; [facility]: [facility] [phone]; We will contact you to schedule the appointment or please call with any ...    F53.0    4. Postpartum anxiety  O99.345  MENTAL HEALTH REFERRAL  - Adult; Outpatient Treatment, Psychiatry; Individual/Couples/Family/Group Therapy/Health Psychology; [facility]: [facility] [phone]; We will contact you to schedule the appointment or please call with any ...    F41.8    5. Bacterial vaginosis  N76.0 metroNIDAZOLE (FLAGYL) 500 MG tablet    B96.89        COUNSELING:  -wet prep +for BV. [employer] message sent with results. Rx for metronidazole 500 mg BID for 7 days sent to preferred pharmacy. Will notify of STI and culture results  -Discussed postpartum depression and anxiety in depth. Sympathized with Reji regarding lack of social support and support from partner at home. Carolyn is open to counseling, mental health counseling and psychiatry referral placed. She is unsure about medication use for anxiety and depression and would like to discuss it more in depth with psychiatric care provider. Discussed some people need combination of counseling and medication to best manage symptoms of anxiety and depression. Reviewed warning signs of a mental health crisis and when to seek help right away. '  -Advised condoms for contraception as she does not want a prescription for anything today. Will send Rx for Plan B if needed. Handouts given on Nexplanon and IUDs, encouraged to call/schedule appointment if she would like to explore these options more in depth.   -Recommend following up with midwives in 3 months or sooner for a mood check/discuss depression/anxiety medications or sooner problems arise.     PORSHA Batista, YOLAM

## 2020-10-08 LAB
C TRACH DNA SPEC QL NAA+PROBE: NEGATIVE
N GONORRHOEA DNA SPEC QL NAA+PROBE: NEGATIVE
SPECIMEN SOURCE: NORMAL
SPECIMEN SOURCE: NORMAL

## 2020-10-08 ASSESSMENT — ANXIETY QUESTIONNAIRES: GAD7 TOTAL SCORE: 17

## 2020-10-08 NOTE — RESULT ENCOUNTER NOTE
Being treated for BV.  Awaiting remaining results.     Trudy STORY, SALLY, [facility]  [phone]

## 2020-10-09 NOTE — RESULT ENCOUNTER NOTE
Ariane Leon,    Your yeast culture came back negative. Hopefully the bacterial vaginosis treatment will be all you need. Please contact us with any questions or concerns.     Thank you,  Mandy Deutsch, SLAVA, APRN, CNM

## 2020-10-12 LAB
Lab: NORMAL
SPECIMEN SOURCE: NORMAL
YEAST SPEC QL CULT: NORMAL

## 2020-12-08 ENCOUNTER — VIRTUAL VISIT (OUTPATIENT)
Dept: PSYCHIATRY | Facility: CLINIC | Age: 20
End: 2020-12-08
Attending: ADVANCED PRACTICE MIDWIFE
Payer: COMMERCIAL

## 2020-12-08 DIAGNOSIS — F33.1 MODERATE EPISODE OF RECURRENT MAJOR DEPRESSIVE DISORDER (H): Primary | ICD-10-CM

## 2020-12-08 PROCEDURE — 99204 OFFICE O/P NEW MOD 45 MIN: CPT | Mod: 95 | Performed by: NURSE PRACTITIONER

## 2020-12-08 NOTE — PATIENT INSTRUCTIONS
TREATMENT PLAN                                                                                                  1. MEDICATIONS:   Start sertraline at 25 mg for 1 week and then increase to 50 mg thereafter    Drug Monitoring:  [place] Prescription Monitoring Program evaluating controlled substances in the last year in [place], [place], [place], [place], or [place]:  [place] Prescription Monitoring Program [] was checked today:  indicates no controlled prescriptions reported in previous 12 months..   Drug Interaction Management: Monitoring for adverse effects  Patient/family to monitor (encouraged to call with concerns): sedation and anxiety    2. CONTINGENCY PLAN:  Consider Increasing Zoloft for further if partial effective    3. CONSULTS/REFERRALS:   Recommend therapy. Referral placed for [facility].  Call [facility] at [phone] if you do not hear from them soon    4. MEDICAL:   None at this time  - Imaging/studies: none  - Coordinate care with PCP (No Ref-Primary, Physician) as needed    5. FOLLOW UP: Schedule an appointment with me in four weeks or sooner as needed. Call [phone] to schedule  Follow up with primary care provider as planned or for acute medical concerns.  Call the psychiatric nurse line with medication questions or concerns at [phone].    6. PSYCHOEDUCATION:  Medication side effects and alternatives reviewed. Health promotion activities recommended and reviewed today. All questions addressed. Education and counseling completed regarding risks and benefits of medications and psychotherapy options.  Call the psychiatric nurse line with medication questions or concerns at [phone].  MyChart may be used to communicate with your provider, but this is not intended to be used for emergencies.  Discussed the Food and Drug Administration (FDA) requires that all antidepressants carry a warning that some children, adolescents and young adults may be at increased risk of suicide when taking antidepressants.  Anyone taking an antidepressant should be watched closely for worsening depression or unusual behavior especially in the first few weeks after starting an SSRI. Keep in mind, antidepressants are more likely to reduce suicide risk in the long run by improving mood.     COMMUNITY RESOURCES:    CRISIS NUMBERS:   National Suicide Prevention Lifeline: 542.205.8261 (TTY: 822.601.6088). Call anytime for help.  (www.suicidepreventionlifeline.org)  National [place] [employer] Mental Illness (www.chirag.org): 968.124.3879 or 397-425-3466.   Mental Health Association (www.mentalhealth.org): 186.632.1705 or 368-644-2496.  [place] Crisis Text Line: Text [place] to 127762  Suicide LifeLine Chat: suicideFullCircle Registryline.org/chat

## 2020-12-08 NOTE — PROGRESS NOTES
"    PSYCHIATRIC DIAGNOSTIC ASSESSMENT ADULT     Name:  Carolyn Cerda  : 2000    Carolyn Cerda is a 20 year old female who is being evaluated via a billable video visit.      The patient has been notified of following:     \"This video visit will be conducted via a call between you and your physician/provider. We have found that certain health care needs can be provided without the need for an in-person physical exam.  This service lets us provide the care you need with a video conversation.  If a prescription is necessary we can send it directly to your pharmacy.  If lab work is needed we can place an order for that and you can then stop by our lab to have the test done at a later time.    Video visits are billed at different rates depending on your insurance coverage.  Please reach out to your insurance provider with any questions.    If during the course of the call the physician/provider feels a video visit is not appropriate, you will not be charged for this service.\"    Patient has given verbal consent for Video visit? Yes  How would you like to obtain your AVS? MyChart  If you are dropped from the video visit, the video invite should be resent to: Text to cell phone: [phone]  Will anyone else be joining your video visit? No    Telemedicine Visit: The patient's condition can be safely assessed and treated via synchronous audio and visual telemedicine encounter.      Reason for Telemedicine Visit:    COVID 19 pandemic and the social and physical recommendations by the CDC and MD.     Originating Site (Patient Location): Patient's home    Distant Site (Provider Location): Provider Remote Setting    Consent:  The patient/guardian has verbally consented to: the potential risks and benefits of telemedicine (video visit) versus in person care; bill my insurance or make self-payment for services provided; and responsibility for payment of non-covered services.     Mode of Communication:  Video Conference " "via [employer]    As the provider I attest to compliance with applicable laws and regulations related to telemedicine.    IDENTIFICATION   Carolyn Cerda is a 20 year old female who prefers to be called: \"Napoleon\"  Therapist: None at present  PCP: Reports she was referred by her midwife  Other Providers: None      History was provided by patient who was good historian(s).    Patient is a 20 year old,  White American female  who presents for initial psychiatric evaluation. Referred by their Midwife to the [facility] ([facility]) for evaluation of depression and anxiety.  Our psychiatry providers act as a specialty service for Primary Care Providers in the [facility] who seek to optimize medications for unstable patients.  Once medications have been optimized, our providers discharge the patient back to the referring Primary Care Provider for ongoing medication management.  This type of system allows our providers to serve a high volume of patients.     Patient attended the session alone. Discussed limits of confidentiality today.     RECORDS AVAILABLE FOR REVIEW: EHR records through [facility]                                                  CHIEF COMPLAINT   Referral for psychiatric medication consult in the context of depression and anxiety postpartum per patient report.      HISTORY OF PRESENT ILLNESS   Carolyn Cerda is a 20 year old female with past psychiatry history including reported diagnosis of Depression and Anxiety    First sought treatment this year When working with her midwife provider after the birth of her daughter 4 months ago and she continued with depression and anxiety symptoms, and tearful and mood lability.  She has never received psychiatric care before, and has never been on psychiatric medications.    Reports she has noted in last a couple of months her body will go 'asleep' and it feels numb and it hursts at the same time.  All over generalized feeling.  Similar " to if your foot fell asleep with pins and needles feeling.  In last two weeks, she also notes hands sweating. At times feels she is running and short of breath but she is sitting down.    Duration: New onset after her daughter was born  Timing:  Worsening in the last two weeks  Context:Transitions  Precipitating factors: Sleep Deprivation and Stress  Stressors/Changes/Losses:Transition to becoming a new mother  Severity of MH sxs:  moderate    PSYCHIATRIC HISTORY:   Previous psychiatry: None    Current Outpatient Supports:   - Therapist/Psychologist: in 2019 seen a therapist when she had a miscarriage and lost her best friend in an MVC  - : denies  - Community Resources: denies    History of Interventions:  counseling    History of Psychiatric Hospitalizations:   - Inpatient: None  - IOP/PHP/Day treatment: denies  Self-injurious Behavior: Denies a history of SIB    History of Suicide Attempts:  No  [place] to go in the water and drown.  She never acted on this, it was in the context of a break-up with an ex-boyfriend who had cheated on her with a cousin  History of Suicidal Ideation: history of SI, years ago  History of Violence/Aggression: denies  History of Commitment? No   Electroconvulsive Therapy (ECT) or Transcranial Magnetic Stimulation (TMS): No   PharmacogenomicTesting (such as [employer]): No       Not breastfeeding    PSYCHIATRIC REVIEW OF SYSTEMS:   Sleep: impacted by daughter who wakes up every two hours for a bottle feeding.  The days she is sleeping she cannot sleep.   Can't take naps during the day.          Depression:  Rates depression a 8-9/10 on a ten point worsening scale.  Last time she felt better was during her pregnancy for a brief period   Interest: Decrease  Depressed Mood  Sleep: Decrease   Energy: Decrease  Concentration: Decrease  Suicide: Yes  Irritability: Increase   Ruminations: Increase   Isolation  PHQ-9 SCORE 10/7/2020 12/2/2020    PHQ-9 Total Score MyChart - 14 (Moderate depression)   PHQ-9 Total Score 14 -   Some encounter information is confidential and restricted. Go to Review Flowsheets activity to see all data.     PHQ9 score is 14 indicating moderate depression.  Suicidal ideation:  Yes thoghts of running away, moving somewhere else.    Anxiety: Rates anxiety a 10/10 on a ten point worsening scale currently.    Trouble relaxing  racing thoughts and can't keep up with them.     ADELINE-7 SCORE 10/7/2020 12/2/2020   Total Score - 13 (moderate anxiety)   Total Score 17 -   Some encounter information is confidential and restricted. Go to Review Flowsheets activity to see all data.     GAD7 score is is 13 indicating moderate anxiety.  Panic: Endorses history of panic attacks , she would get panic and then getting pain and tightness.  She was given albuterol for her anxiety and it she gets more panic due to SOB   Social anxiety: gets insecure around people, which leads to feeling judges  PTSD: Experienced or witnessed trauma including she can't get over her exboyfriend who cheated.  This is still impacted her and intrusive memories of this  Feeling or acting like the event is happening again (Flashbacks)  Impaired Function  Upsetting or intrusive memories of the trauma      OCD: Denies hx of obsessions or compulsions irresistible urges to do things repeatedly such as counting, washing hands, checking, etc. Denies hoarding.  No current symptoms  Mood lability:  Could not elicit true manic symptoms, extended periods of decreased need for sleep, extreme high level of energy, or grandiosity. Denies any symptoms consistent with hypomania.    Psychosis: Denies thought disturbance symptoms or hx of AH, VH, TH, or OH. and Denies having periods of feeling others were plotting to harm them, people reading their mind, reading others mind, receiving special messages from TV, computer, etc.   ADD / ADHD: Denies previous dx of ADHD prior to age 10.    Attention Problem(s)   Distractible    Eating Disorder:  Denies concerns with weight or body image beyond normal concern.  Denies restricting or purging behaviors or excessive exercise for weight control.    All other ROS negative.     A 12-item WHODAS 2.0 assessment was completed by the patient today and recorded in EPIC.    WHODAS 2.0 Total Score 12/2/2020   Total Score MyChart 36   Some encounter information is confidential and restricted. Go to Review Flowsheets activity to see all data.         FAMILY, MEDICAL, SURGICAL HISTORY REVIEWED.  MEDICATION HAVE BEEN REVIEWED AND ARE CURRENT TO THE BEST OF MY KNOWLEDGE AND ABILITY.  partnered / significant other.  Patient is currently unemployed. Lives with SO     MEDICATIONS                                                                                              Per EHR:   Current Outpatient Medications   Medication Sig     albuterol (PROAIR HFA/PROVENTIL HFA/VENTOLIN HFA) 108 (90 Base) MCG/ACT inhaler Inhale 2 puffs into the lungs every 6 hours     acetaminophen (TYLENOL) 500 MG tablet Take 2 tabs po q 6hrs prn (Patient not taking: Reported on 8/12/2020)     ibuprofen (ADVIL/MOTRIN) 800 MG tablet Take 1 tablet (800 mg) by mouth every 6 hours as needed for other (cramping) (Patient not taking: Reported on 8/12/2020)     order for DME Equipment being ordered: double electric breast pump (Patient not taking: Reported on 8/12/2020)     VITAMIN D PO      No current facility-administered medications for this visit.      I have reviewed this patient's current medications  [place] Prescription Monitoring Program reviewed.  Controlled substances in the last year noted in [place], [place], [place], [place], or [place]:  None  Per EHR:    Current Outpatient Medications:      albuterol (PROAIR HFA/PROVENTIL HFA/VENTOLIN HFA) 108 (90 Base) MCG/ACT inhaler, Inhale 2 puffs into the lungs every 6 hours, Disp: 1 Inhaler, Rfl: 0     acetaminophen (TYLENOL) 500 MG tablet, Take 2 tabs po q 6hrs prn  "(Patient not taking: Reported on 8/12/2020), Disp: 60 tablet, Rfl: 0     ibuprofen (ADVIL/MOTRIN) 800 MG tablet, Take 1 tablet (800 mg) by mouth every 6 hours as needed for other (cramping) (Patient not taking: Reported on 8/12/2020), Disp: 60 tablet, Rfl: 0     order for DME, Equipment being ordered: double electric breast pump (Patient not taking: Reported on 8/12/2020), Disp: 1 Device, Rfl: 0     VITAMIN D PO, , Disp: , Rfl:     CURRENT MEDICATION SIDE EFFECTS REPORTED:  not applicable     NOTES ABOUT CURRENT PSYCHOTROPIC MEDICATIONS:   none    PAST PSYCHOTROPIC MEDICATIONS:  none    VITALS   There were no vitals taken for this visit.   LAST DOCUMENTED VITAL SIGN:  DATE:  10/07/2020   VALUES:  112/64, HR 73    DEVELOPMENTAL / BIRTH HISTORY:   Pregnancy & Delivery: Full Term, Vaginal, no complications reported  Exposure to substances: none  Developmental Milestones: no reported delays    MEDICAL / SURGICAL HISTORY    Past Medical Hx:  Past Medical History:   Diagnosis Date     Asthma      Asthma affecting pregnancy in first trimester 1/7/2020       HEIGHT/WEIGHT:  Ht Readings from Last 2 Encounters:   12/18/19 1.6 m (5' 3\")   08/30/19 1.6 m (5' 3\")      Wt Readings from Last 2 Encounters:   10/07/20 66.2 kg (146 lb)   08/12/20 72.1 kg (159 lb)    Estimated body mass index is 25.86 kg/m  as calculated from the following:    Height as of 12/18/19: 1.6 m (5' 3\").    Weight as of 10/7/20: 66.2 kg (146 lb).     Seizures or Head Injury: Denies history of head injury. Denies history of seizures.  History of cardiac disease, rheumatic fever, fainting or dizziness, especially with exercise, seizures, chest pain or shortness of breath with exercise, unexplained change in exercise tolerance, palpitations, high blood pressure, or heart murmur?   Yes fainted alot while growing up. Last was years ago  Currently taking any prescribed or over-the-counter medications/health supplements, particularly those with CV effects?   " No    Past Surgical Hx:  Past Surgical History:   Procedure Laterality Date      SECTION N/A 2020    Procedure:  SECTION;  Surgeon: Ernestina Conrad MD;  Location: [facility]     NO HISTORY OF SURGERY        Surgery:   Past Surgical History:   Procedure Laterality Date      SECTION N/A 2020    Procedure:  SECTION;  Surgeon: Ernestina Conrad MD;  Location: [facility]     NO HISTORY OF SURGERY         Medical Hospitalizations: None, 2014 had a horse injury with 2 days of paralyzed  Serious Medical Illnesses: None    Diet: No Restrictions  Exercise: 3 times a week  Supplements: Reviewed per Electronic Medical Record Today    LABS & IMAGING                                                                                                                  Recent Labs   Lab Test 20  0852 20  0611 20  0611   WBC  --   --  16.4*   HGB 12.1   < > 8.4*   HCT  --   --  25.1*   MCV  --   --  85   PLT  --   --  160   ANEU  --   --  14.6*    < > = values in this interval not displayed.     Recent Labs   Lab Test 20  1121 19  1752   NA  --  139   POTASSIUM  --  3.3*   CHLORIDE  --  108   CO2  --  22   GLC  --  90   DEVIN  --  8.6   BUN  --  10   CR 0.47* 0.57   GFRESTIMATED >90 >90   ALBUMIN  --  3.7   PROTTOTAL  --  7.5   AST 13 20   ALT 20 32   ALKPHOS  --  70   BILITOTAL  --  0.5     No lab results found.  No lab results found.    ALLERGY & IMMUNIZATIONS       Allergies   Allergen Reactions     Fig Extract [Ficus] Anaphylaxis     fig         FAMILY MEDICAL HISTORY:     Family History   Problem Relation Age of Onset     Lupus Mother      No Known Problems Father      No Known Problems Sister      No Known Problems Brother      No Known Problems Maternal Grandmother      No Known Problems Maternal Grandfather      No Known Problems Paternal Grandmother      No Known Problems Other        Family history of sudden or unexplained death or an event requiring resuscitation  in children or young adults, cardiac arrhythmias (eg, Mark-Parkinson-White syndrome), long QT syndrome, catecholaminergic paroxysmal ventricular tachycardia, Brugada syndrome, arrhythmogenic right ventricular dysplasia, hypertrophic cardiomyopathy, dilated cardiomyopathy, or Marfan syndrome?  No    FAMILY PSYCHIATRIC HISTORY:   Maternal: First Generation:  Yes: biomother on meds or hospitalized for medical or surgical issues.  Mom does nt seek MH tx, Second Generation:  Unknown and Third Generation:  Unknown  Paternal: First Generation:  Denies, Second Generation:  Denies and Third Generation:  Denies, Reserved and dont talk about things  Siblings: Yes: older sister anxiety and depression.  Younger sisters in therapy  Substance use history in family:  Yes: alcohol is prevelent  Family suicide history: Denies  Medications family responded to: Unknown     SIGNIFICANT SOCIAL/FAMILY HISTORY:                                           Born in [place], [place] and at age 9 yo moved to [place]. Parents .  Moved out of house at age 14 from [place] to [place].    Siblings:  Three sisters  Childhood: Yes intact home, always had basic needs met.  But not nurtured.  When moved to [place] did not speak the language, was bullied in school.    Highest education level was some high school but no degree.  Was attempting to get GED. Passed some of the tests.   Service: No  Relationship:  in a relationship  with father of baby, has been challenging  Children: zero   Current living situation: with significant other and daughter.  Relationship stressors  Employment Status: Patient is currently unemployed, not since baby was born.  Trauma history: There are times she has been in situations that resulted in her trauma.   Issues of possible neglect are not present.     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  Journaling about baby every day.  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    STRENGTHS AND OPPORTUNITIES:   Carolyn Cerda identified the following strengths or resources that may help she succeed in counseling: commitment to health and well being, exercise routine, insight, intelligence and motivation. Things that may interfere with the patient's success include:  few friends and lack of family support.    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     Current Substance use Cannabis on occcasion  Past use alcohol/substance use: Denies   History   Drug Use Unknown     Consequences:  reports no problems as a result of their drinking / drug use.   Chemical dependency history: Patient has not received chemical dependency treatment in the past  Recovery Programming Involvement: Not Applicable    Based on the clinical interview, there  are not indications of drug or alcohol abuse.     MEDICAL REVIEW OF SYSTEMS:   Ten system review was completed with pertinent positives noted above      MENTAL STATUS EXAM:   General/Constitutional:  Appearance:   awake, alert, adequately groomed, appeared stated age and no apparent distress  Attitude:    cooperative   Eye Contact:  good  Musculoskeletal:  Psychomotor Behavior:  no evidence of tardive dyskinesia, dystonia, or tics from the head up  Psychiatric:  Speech:  clear, coherent, regular rate, rhythm, and volume,   No pressure speech noted.  Associations:  no loose associations  Thought Process:   logical, linear and goal oriented  Thought Content:    No evidence of suicidal ideation or homicidal ideation, no evidence of  psychotic thought, no auditory hallucinations present and no visual hallucinations present  Mood:  anxious and depressed  Affect:  mood congruent and tearful at times  Insight:  fair  Judgment:  fair, adequate for safety  Impulse Control:  intact  Neurological:  Oriented to:  person, place, time, and situation  Attention Span and Concentration:  normal    Language: intact     Recent and Remote Memory:  Intact to interview. Not formally assessed. No amnesia.    Fund of Knowledge: appropriate       SAFETY   Feels safe in home: Yes  Suicidal ideation: No    History of suicide attempts:  No   Hx of impulsivity: Yes     SAFETY ASSESSMENT:   Risk factors: SI, maladaptive coping, trauma history, family dynamics, history of depression and social isolation  Protective factors: peer support, engaged in treatment, future oriented  and meaningfully engaged in safety planning    Patient Risk Assessment:  Today Carolyn reported no current SI. Recommended that patient call 911 or go to the local ED should there be a change in any of these risk factors.     LANGUAGE OR COMMUNICATION BARRIERS   Are there language or communication issues or need for modification in treatment? No   Are there ethnic, cultural or Taoism factors that may be relevant for therapy? No  Client identified their preferred language to be English  Does the client need the assistance of an  or other support involved in therapy? No    DSM 5 DIAGNOSIS:   296.32 (F33.1) Major Depressive Disorder, Recurrent Episode, Moderate _ with postpartum onset    MEETS CRITERIA PER DSM 5 AS FOLLOWS:   Meets criteria for Major Depressive Disorder per DSM5 as follows:   A. Five (or more) of the following symptoms have been present during the same 2-week period and represent a change from previous functioning: at least one of the symptoms is either (1) depressed mood or (2) loss of interest or pleasure.     *Depressed mood most of the day, nearly every day, as  indicated by either subjective report (e.g., feels sad, empty, hopeless) or observation made by others (e.g., appears tearful).   *Markedly diminished interest or pleasure in all, or almost all, activities most of the day, nearly every day (as indicated by either subjective account or observation).    *Significant weight loss when not dieting or weight gain (e.g., a change of more than 5% of body weight in a month), or decrease or increase in appetite nearly every day.     *Insomnia or hypersomnia nearly every day. Fatigue or loss of energy nearly every day.   *Feelings of worthlessness or excessive or inappropriate guilt which may be delusional) nearly every day (not merely self-reproach or guilt about being sick).   *Diminished ability to think or concentrate, or indecisiveness, nearly every day (either by subjective account or as observed by others)  *Recurrent thoughts of death (not just fear of dying), recurrent suicidal ideation without a specific plan, or a suicide attempt or a specific plan for committing suicide.     B. The symptoms cause clinically significant distress or impairment in social, occupational, or other important areas of functioning.      ASSESSMENT    Carolyn Cerda is a 20 year old White American female presenting for psychiatric evaluation and medication management through the [facility].  Information is obtained from patient and available records.  Denies prior psychiatric hospitalizations. Hx of suicidal ideation, no suicide attempts. No history of self-injurious behaviors. No identified genetic load for psychiatric illnesses. Grew up in a chaotic environment experiencing all basic needs being met, but not feeling nurtured and these life events are likely contributing to the clinical picture.    At this time, diagnostic impression is most consistent with Postpartum depression.  She also has a history of invalidation as a child and traumatic experiences as an  adult that are impacting the clinical picture.  She is motivated to make changes and feel better.  She is open to both medications and therapy.  She has never trialed any medications and will start with sertraline, and SSRI.  We will also refer for therapy.  Will monitor closely for an underlying bipolar trajectory as a postpartum episode of depression is a risk of a potential mood disorder.  However she does not have a genetic load for bipolar that she is aware of.  The somatic symptoms of feeling areas of her body are going to sleep with the sensation of pins-and-needles appears to have an underlying anxiety component.  We will monitor this and the response to SSRI      MEDICAL COMORBIDITY IMPACTING CLINICAL PICTURE: None noted    TREATMENT PLAN                                                                                                  1. MEDICATIONS:   Start sertraline at 25 mg for 1 week and then increase to 50 mg thereafter    Drug Monitoring:  [place] Prescription Monitoring Program evaluating controlled substances in the last year in [place], [place], [place], [place], or [place]:  [place] Prescription Monitoring Program [] was checked today:  indicates no controlled prescriptions reported in previous 12 months..   Drug Interaction Management: Monitoring for adverse effects  Patient/family to monitor (encouraged to call with concerns): sedation and anxiety    2. CONTINGENCY PLAN:  Consider Increasing Zoloft for further if partial effective    3. CONSULTS/REFERRALS:   Recommend therapy. Referral placed for [facility].  Call [facility] at [phone] if you do not hear from them soon    4. MEDICAL:   None at this time  - Imaging/studies: none  - Coordinate care with PCP (No Ref-Primary, Physician) as needed    5. FOLLOW UP: Schedule an appointment with me in four weeks or sooner as needed. Call [phone] to schedule  Follow up with primary care provider as planned or for acute medical concerns.  Call the psychiatric  nurse line with medication questions or concerns at [phone].    6. PSYCHOEDUCATION:  Medication side effects and alternatives reviewed. Health promotion activities recommended and reviewed today. All questions addressed. Education and counseling completed regarding risks and benefits of medications and psychotherapy options.  Call the psychiatric nurse line with medication questions or concerns at [phone].  MyChart may be used to communicate with your provider, but this is not intended to be used for emergencies.  Discussed the Food and Drug Administration (FDA) requires that all antidepressants carry a warning that some children, adolescents and young adults may be at increased risk of suicide when taking antidepressants. Anyone taking an antidepressant should be watched closely for worsening depression or unusual behavior especially in the first few weeks after starting an SSRI. Keep in mind, antidepressants are more likely to reduce suicide risk in the long run by improving mood.     COMMUNITY RESOURCES:    CRISIS NUMBERS:   National Suicide Prevention Lifeline: 743.163.5769 (TTY: 602.132.6944). Call anytime for help.  (www.suicidepreventionlifeline.org)  National [place] on Mental Illness (www.chirag.org): 473-770-6000 or 922-698-4445.   Mental Health Association (www.mentalhealth.org): 303.778.1206 or 850-269-6772.  [place] Crisis Text Line: Text [place] to 015047  Suicide LifeLine Chat: suicideNellixline.org/chat    ADMINISTRATIVE BILLING:   Greater than 50% of time was spent in counseling and coordination of care regarding above diagnoses and treatment plan.    Start: 12/08/2020 10:53 am   Stop: 12/08/2020 11:44 am    Patient Status:  Our psychiatry providers act as a specialty service for Primary Care Providers in the [facility] that seek to optimize medications for unstable patients.  Once medications have been optimized, our providers discharge the patient back to the referring Primary  Care Provider for ongoing medication management.  This type of system allows our providers to serve a high volume of patients. At this time  Patient will continue to be seen for ongoing consultation and stabilization.    Signed:   Erlinda Leblanc, MSN, APRN, [facility] ([facility])   Chart documentation done in part with Dragon Voice Recognition software.  Although reviewed after completion, some word and grammatical errors may remain.

## 2020-12-08 NOTE — Clinical Note
Thank you for the Psychiatry referral to the [facility] ([facility]). Our psychiatry providers act as a specialty service for Primary Care Providers in the [facility] who seek to optimize medications for unstable patients.  Once medications have been optimized, our providers discharge the patient back to the referring Primary Care Provider for ongoing medication management.  This type of system allows our providers to serve a high volume of patients.     Please see my Impression and Plan. I will continue to work with them in stabilizing their mood.  If you have any questions or concerns, please let me know. Thank you again!  - Erlinda

## 2021-01-03 ENCOUNTER — HEALTH MAINTENANCE LETTER (OUTPATIENT)
Age: 21
End: 2021-01-03

## 2021-03-09 DIAGNOSIS — O99.519 ASTHMA COMPLICATING PREGNANCY, ANTEPARTUM: ICD-10-CM

## 2021-03-09 DIAGNOSIS — J45.909 ASTHMA COMPLICATING PREGNANCY, ANTEPARTUM: ICD-10-CM

## 2021-03-09 RX ORDER — ALBUTEROL SULFATE 90 UG/1
AEROSOL, METERED RESPIRATORY (INHALATION)
Qty: 8.5 INHALER | Refills: 0 | Status: SHIPPED | OUTPATIENT
Start: 2021-03-09

## 2021-03-09 NOTE — TELEPHONE ENCOUNTER
"Requested Prescriptions   Pending Prescriptions Disp Refills     PROAIR  (90 Base) MCG/ACT inhaler [Pharmacy Med Name: PROAIR HFA 90 MCG INHALER] 8.5 Inhaler 0     Sig: INHALE 2 PUFFS INTO THE LUNGS EVERY 6 HOURS       Asthma Maintenance Inhalers - Anticholinergics Failed - 3/9/2021  9:08 AM        Failed - Asthma control assessment score within normal limits in last 6 months     Please review ACT score.           Failed - Recent (6 mo) or future (30 days) visit within the authorizing provider's specialty     Patient had office visit in the last 6 months or has a visit in the next 30 days with authorizing provider or within the authorizing provider's specialty.  See \"Patient Info\" tab in inbasket, or \"Choose Columns\" in Meds & Orders section of the refill encounter.            Passed - Patient is age 12 years or older        Passed - Medication is active on med list       Short-Acting Beta Agonist Inhalers Protocol  Failed - 3/9/2021  9:08 AM        Failed - Asthma control assessment score within normal limits in last 6 months     Please review ACT score.           Failed - Recent (6 mo) or future (30 days) visit within the authorizing provider's specialty     Patient had office visit in the last 6 months or has a visit in the next 30 days with authorizing provider or within the authorizing provider's specialty.  See \"Patient Info\" tab in inbasket, or \"Choose Columns\" in Meds & Orders section of the refill encounter.            Passed - Patient is age 12 or older        Passed - Medication is active on med list           Last Written Prescription Date:  7/14/20  Last Fill Quantity: 1,  # refills: 0   Last office visit: Kathy Whitt:  10/7/20   Future Office Visit:  none          "

## 2021-03-09 NOTE — TELEPHONE ENCOUNTER
Prescription approved per [facility] Refill Protocol.  Josefina Rincon RN on 3/9/2021 at 10:44 AM

## 2021-04-07 ENCOUNTER — E-VISIT (OUTPATIENT)
Dept: MIDWIFE SERVICES | Facility: CLINIC | Age: 21
End: 2021-04-07
Payer: COMMERCIAL

## 2021-04-07 ENCOUNTER — TELEPHONE (OUTPATIENT)
Dept: OBGYN | Facility: CLINIC | Age: 21
End: 2021-04-07

## 2021-04-07 DIAGNOSIS — F41.8 POSTPARTUM ANXIETY: ICD-10-CM

## 2021-04-07 ASSESSMENT — ANXIETY QUESTIONNAIRES
2. NOT BEING ABLE TO STOP OR CONTROL WORRYING: NEARLY EVERY DAY
GAD7 TOTAL SCORE: 21
GAD7 TOTAL SCORE: 21
5. BEING SO RESTLESS THAT IT IS HARD TO SIT STILL: NEARLY EVERY DAY
6. BECOMING EASILY ANNOYED OR IRRITABLE: NEARLY EVERY DAY
7. FEELING AFRAID AS IF SOMETHING AWFUL MIGHT HAPPEN: NEARLY EVERY DAY
3. WORRYING TOO MUCH ABOUT DIFFERENT THINGS: NEARLY EVERY DAY
4. TROUBLE RELAXING: NEARLY EVERY DAY
1. FEELING NERVOUS, ANXIOUS, OR ON EDGE: NEARLY EVERY DAY
GAD7 TOTAL SCORE: 21
7. FEELING AFRAID AS IF SOMETHING AWFUL MIGHT HAPPEN: NEARLY EVERY DAY

## 2021-04-07 ASSESSMENT — PATIENT HEALTH QUESTIONNAIRE - PHQ9
SUM OF ALL RESPONSES TO PHQ QUESTIONS 1-9: 19
SUM OF ALL RESPONSES TO PHQ QUESTIONS 1-9: 19
10. IF YOU CHECKED OFF ANY PROBLEMS, HOW DIFFICULT HAVE THESE PROBLEMS MADE IT FOR YOU TO DO YOUR WORK, TAKE CARE OF THINGS AT HOME, OR GET ALONG WITH OTHER PEOPLE: EXTREMELY DIFFICULT

## 2021-04-07 NOTE — PATIENT INSTRUCTIONS
"    Warning Signs of Suicide and What You Can Do  If you think a person could be suicidal, ask, \"Have you thought about suicide?\" Asking won't make it more likely that they will try to hurt themselves. In fact, most people with suicidal thoughts say they are relieved when the question is asked.   If they say yes, they may already have a plan for how and when they will attempt it. Find out as much as you can. The more detailed the plan, and the easier it is to carry out, the more danger the person is in right now.     Know the warning signs  The warning signs for suicide include:    Threats or talk of suicide    Talking about death and dying    Changing eating or sleeping habits (for instance, not sleeping or sleeping all of the time)    Feeling hopeless    Suddenly buying a gun or other weapon    Saying things such as \"Soon, I won't be a problem\" or \"Nothing matters\"    Giving away things they own, making out a will, or planning their     Suddenly being happy or calm after being depressed  Things that put a person at a higher risk of attempting suicide include:     A history of suicide in the person's family    Past suicide attempts    Alcohol and drug use, along with impulsive behaviors    Having a diagnosed mood disorder such as depression or bipolar disorder    History of trauma or abuse including bullying    Major losses such as a divorce, death of a loved one, money problems, or legal problems    Having access to a lethal weapon (such as guns in the home)    Long-term (chronic) physical illnesses, including chronic pain    Being around others with suicidal behavior  Get help  Don't try to handle this alone. You can be the most help by getting the person to a trained professional. Suicidal thinking may be a sign of depression. This is a serious but treatable illness.   In an emergency--call 911  Don't leave the person alone. Anyone who is at imminent risk of suicide needs psychiatric services right away. " "The person must be constantly watched, and never left out of sight. Call 911 or a 24-hour suicide crisis hotline. You can search for this online. You can also take the person to the nearest hospital emergency room.   Don't keep it a secret and don't wait  Call a mental health clinic or a licensed mental health professional in your area right away. This may be a psychiatrist, clinical psychologist, psychiatric or licensed clinical , marriage and family counselor, or clergy. If you don't know how to contact such professionals and there is an immediate risk, call 911. Tell them you need help for a person who is thinking about suicide.   Resources    National Suicide Prevention Xuswsoif126-704-4167 (299-469-DZWF)www.suicidepreventionlifeline.org    National Suicide Sjegxxf629-295-7890 (800-SUICIDE)    National [place] of Mental Hlgdgy791-716-2968dxg.[facility].nih.gov    National [place] on Mental Otjddjb342-861-5735bpt.chirag.org    Mental Health Puczvrr035-294-8147vrq.[facility].org    [employer] last reviewed this educational content on 1/1/2020 2000-2020 [employer]. All rights reserved. This information is not intended as a substitute for professional medical care. Always follow your healthcare professional's instructions.          Recognizing Suicide Warning Signs in Yourself     People who are thinking about suicide may not know they are depressed. Certain thoughts, feelings, and actions can be signals that let you know you may need help. The best thing you can do is watch for signs that you may be at risk. Then, ask for help. You can talk with your regular healthcare provider or get help from a mental health provider.   Depression  Depression is a treatable illness, just like diabetes or heart disease. And like those illnesses, depression is not something that you can just \"snap out of.\" To feel better, treatment is needed before depression gets to a point that it can endanger your life. To know " if depression is causing you to feel like ending your life, ask yourself:     Do I feel worthless, guilty, helpless, or hopeless?    Have I been feeling sad, down, or blue on most days?    Have I lost interest in my work or people I used to enjoy?    Do I have trouble sleeping or do I sleep too much?    Do I eat more or less than normal?    Do I feel tired, weak, and low on energy?    Do I feel restless and unable to sit still?    Do I have trouble thinking or making choices?    Do I cry more than normal?    Do I feel life isn't worth living?  Warning signs for suicide  Call your healthcare provider or get help right away if you have any of the warning signs below. You can also call a mental health clinic or a 24-hour suicide crisis hotline for help and support. Warning signs for suicide include:     Thinking often about taking your life    Planning how you may attempt it    Talking or writing about committing suicide    Feeling that death is the only solution to your problems    Feeling a pressing need to make out your will or arrange your     Giving away things you own    Taking part in risky behaviors, such as having sex with someone you don't know, or drinking and driving    Buying a lethal weapon, such as a gun, or hoarding medicines that could be used in an overdose  Call 911  If you are in immediate risk of harming yourself, call 911  To learn more  For more information about depression and suicide prevention:     National [place] of Mental Health 929-712-0840ler.[facility].nih.gov    National Suicide Prevention Lifeline 313-219-6749 (019-332-VEZU) www.suicidepreventionlifeline.org    National [place] on Mental Illness 289-599-4025jic.chirag.org    Mental Health Unalvov942-721-4603zkq.[facility].org    National Suicide Zuiljxc413-525-4029 (800-SUICIDE)  Abram last reviewed this educational content on 2020-2020 [employer]. All rights reserved. This information is not intended as a  substitute for professional medical care. Always follow your healthcare professional's instructions.

## 2021-04-07 NOTE — TELEPHONE ENCOUNTER
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  States that she has been having panic attacks and anxiety 3-4 days per week.  She states that felt like FOB was not helping with care of her daughter and decided to move her and her daughter to [facility] to be with her parents.  Since moving she states that she feels like panic attacks and anxiety have increases, is not able to sleep more than 2 hours at night.  She also states that she is having thoughts of suicide that are fleeting and she has no plan.  She also states that at night time when it is dark and she is trying to sleep, she has been hearing voices.  She states that she can't hear make out what the voices are saying, but that she hears them.  She states that she feels like she needs to get help for her symptoms, but is unsure who to ask or how to get help. Denies homicidal thoughts, inability to care for self or daughter; is working 40 hrs per week.  She states that she currently feels safe at her parents house, trusts them to care for her daughter, but also feels like it is making her anxiety worse.      Discussed that due to being in [place] and based on her symptoms, my recommendation would be to take herself to the closest ER and tell them of her signs and symptoms as well as her concerns.  She states that she does not feel comfortable leaving her daughter with her parents for a long period of time and doesn't want to \"get locked up in a loony bin\".  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  Also discussed 24hr crisis phone numbers as option to call.  She states that she will call the 24hour number.  She also states that she is considering coming back to [place] because she feels like she can trust the medical care and providers more.     Again, discussed recommendation to go to ER to seek emergent evaluation.  Crisis phone numbers sent via [employer] as well " as number for psychiatric clinic she had care from in Dec.2020 in [place].  Carolyn states that she will call crisis number if needed and will call psychiatric provider in the AM.  Encouraged to go to ER for any continued or worsening signs and symptoms.      Trudy STORY CNM, [facility]  [phone]      Total call time: 29 min

## 2021-04-08 ASSESSMENT — PATIENT HEALTH QUESTIONNAIRE - PHQ9: SUM OF ALL RESPONSES TO PHQ QUESTIONS 1-9: 19

## 2021-04-08 ASSESSMENT — ANXIETY QUESTIONNAIRES: GAD7 TOTAL SCORE: 21

## 2021-05-19 ENCOUNTER — VIRTUAL VISIT (OUTPATIENT)
Dept: PSYCHIATRY | Facility: CLINIC | Age: 21
End: 2021-05-19
Payer: COMMERCIAL

## 2021-05-19 DIAGNOSIS — F33.1 MODERATE EPISODE OF RECURRENT MAJOR DEPRESSIVE DISORDER (H): Primary | ICD-10-CM

## 2021-05-19 PROCEDURE — 99214 OFFICE O/P EST MOD 30 MIN: CPT | Mod: 95 | Performed by: NURSE PRACTITIONER

## 2021-05-19 RX ORDER — HYDROXYZINE PAMOATE 25 MG/1
25 CAPSULE ORAL
Qty: 30 CAPSULE | Refills: 1 | Status: SHIPPED | OUTPATIENT
Start: 2021-05-19

## 2021-05-19 RX ORDER — ESCITALOPRAM OXALATE 10 MG/1
10 TABLET ORAL DAILY
Qty: 30 TABLET | Refills: 1 | Status: SHIPPED | OUTPATIENT
Start: 2021-05-19

## 2021-05-19 ASSESSMENT — ANXIETY QUESTIONNAIRES
2. NOT BEING ABLE TO STOP OR CONTROL WORRYING: NEARLY EVERY DAY
5. BEING SO RESTLESS THAT IT IS HARD TO SIT STILL: NEARLY EVERY DAY
1. FEELING NERVOUS, ANXIOUS, OR ON EDGE: NEARLY EVERY DAY
7. FEELING AFRAID AS IF SOMETHING AWFUL MIGHT HAPPEN: NEARLY EVERY DAY
3. WORRYING TOO MUCH ABOUT DIFFERENT THINGS: NEARLY EVERY DAY
6. BECOMING EASILY ANNOYED OR IRRITABLE: MORE THAN HALF THE DAYS
GAD7 TOTAL SCORE: 20

## 2021-05-19 ASSESSMENT — PATIENT HEALTH QUESTIONNAIRE - PHQ9
5. POOR APPETITE OR OVEREATING: NEARLY EVERY DAY
SUM OF ALL RESPONSES TO PHQ QUESTIONS 1-9: 25

## 2021-05-19 NOTE — PROGRESS NOTES
"   PSYCHIATRIC MEDICATION FOLLOW UP APPT: ADULT     Name:  Carolyn Cerda  : 2000    Carolyn is a 21 year old who is being evaluated via a billable video visit.      How would you like to obtain your AVS? MyChart  If the video visit is dropped, the invitation should be resent by: Text to cell phone: [phone]  Will anyone else be joining your video visit? No     Telemedicine Visit: The patient's condition can be safely assessed and treated via synchronous audio and visual telemedicine encounter.      Reason for Telemedicine Visit: COVID 19 pandemic and the social and physical recommendations by the CDC and MD.      Originating Site (Patient Location): Patient's home    Distant Site (Provider Location): Provider Remote Setting    Consent:  The patient/guardian has verbally consented to: the potential risks and benefits of telemedicine (video visit or phone) versus in person care; bill my insurance or make self-payment for services provided; and responsibility for payment of non-covered services.     Mode of Communication:  [employer] video platform     As the provider I attest to compliance with applicable laws and regulations related to telemedicine.    IDENTIFICATION   Carolyn Cerda is a 20 year old female who prefers to be called: \"Markdebbieda\"  Therapist: None at present  PCP: Reports she was referred by her midwife  Other Providers: None      Patient attended the phone/video session alone.    Last seen for outpatient psychiatry Consultation on 2020.      FOLLOWING PLAN PUT INTO PLACE: Start sertraline at 25 mg for 1 week and then increase to 50 mg thereafter   Recommend therapy. Referral placed for [facility].  Call [facility] at [phone] if you do not hear from them soon    INTERIM HISTORY     COMMUNICATIONS FROM PATIENT VIA:  none    RECORDS AVAILABLE FOR REVIEW: EHR records through [employer]  and  previous psychiatric progress note    HISTORY OF PRESENT ILLNESS   [facility]PS referral for psychiatric " medication consult in 12/2020.  Reports past psychiatry history including depression and anxiety.    Denies prior psychiatric hospitalizations. Hx of suicidal ideation, no suicide attempts. No history of self-injurious behaviors. No identified genetic load for psychiatric illnesses. Grew up in a chaotic environment experiencing all basic needs being met, but not feeling nurtured and these life events are likely contributing to the clinical picture.    First sought treatment this year When working with her midwife provider after the birth of her daughter 4 months ago and she continued with depression and anxiety symptoms, and tearful and mood lability.  She has never received psychiatric care before, and has never been on psychiatric medications.  At the time of initial Diagnostic Assessment, diagnostic impression is most consistent with Postpartum depression.  She also has a history of invalidation as a child and traumatic experiences as an adult that are impacting the clinical picture.  She is motivated to make changes and feel better.  She is open to both medications and therapy.  She had never trialed any medications.  Continue endorses to monitor closely for an underlying bipolar trajectory as a postpartum episode of depression is a risk of a potential mood disorder.  However she does not have a genetic load for bipolar that she is aware of.       FAMILY, MEDICAL, SURGICAL HISTORY REVIEWED.  MEDICATION HAVE BEEN REVIEWED AND ARE CURRENT TO THE BEST OF MY KNOWLEDGE AND ABILITY.  Single, Patient is currently unemployed. Was working 12 hour shifts Lives alone with child.  New mother    MEDICATIONS                                                                                                NOTES ABOUT CURRENT PSYCHOTROPIC MEDICATIONS:   none     PAST PSYCHOTROPIC MEDICATIONS:  Trial of sertraline for two months, not effective, believe it contributed to auditory hallucinations and visual hallucinations      TODAY  "PATIENT REPORTS THE FOLLOWING PSYCHIATRIC ROS:   MOOD: \"horrible\"    PROBLEM: DEPRESSION: Worsening.  With the sertraline was having auditory hallucinations and visual hallucinations of shadows.  Still continues with this on and off.  She took the medication for two months.  Was not effective initially and then felt more sad and distant on the medication.  Continues to work 12 hours at night and recently quit which is out of character.  Feed back from friends and family that she is \"lazy\" and only can do what she needs for work and her baby.  Otherwise no motivation and feeling depressed.  Baby is her world.  Memory is decreasing    Last PHQ-9 5/19/2021   1.  Little interest or pleasure in doing things 3   2.  Feeling down, depressed, or hopeless 3   3.  Trouble falling or staying asleep, or sleeping too much 3   4.  Feeling tired or having little energy 3   5.  Poor appetite or overeating 3   6.  Feeling bad about yourself 3   7.  Trouble concentrating 3   8.  Moving slowly or restless 3   Q9: Thoughts of better off dead/self-harm past 2 weeks 1   PHQ-9 Total Score 25   Difficulty at work, home, or with people Very difficult   In the past two weeks have you had thoughts of suicide or self harm? -   Do you have concerns about your personal safety or the safety of others? -   Some encounter information is confidential and restricted. Go to Review Flowsheets activity to see all data.     PHQ-9 SCORE 12/2/2020 4/7/2021 5/19/2021   PHQ-9 Total Score MyChart 14 (Moderate depression) 19 (Moderately severe depression) -   PHQ-9 Total Score - 19 25   Some encounter information is confidential and restricted. Go to Review Flowsheets activity to see all data.   PHQ9 score is 25 indicating severe depression.   Suicidal ideation:  Yes Endorses passive SI, no intent or plan.     PROBLEM: ANXIETY: Worsening   Endorses history of panic attacks , she would get panic and then getting pain and tightness.  She was given albuterol for " her anxiety and it she gets more panic due to SOB    ADELINE-7   [employer], 2002; Used with Permission) 10/7/2020 12/2/2020 4/7/2021 5/19/2021   1. Feeling nervous, anxious, or on edge - Nearly every day Nearly every day -   2. Not being able to stop or control worrying - More than half the days Nearly every day -   3. Worrying too much about different things - More than half the days Nearly every day -   4. Trouble relaxing - More than half the days Nearly every day -   5. Being so restless that it is hard to sit still - Several days Nearly every day -   6. Becoming easily annoyed or irritable - Nearly every day Nearly every day -   7. Feeling afraid, as if something awful might happen - Not at all Nearly every day -   ADELINE 7 TOTAL SCORE - 13 (moderate anxiety) 21 (severe anxiety) -   1. Feeling nervous, anxious, or on edge 3 - 3 3   2. Not being able to stop or control worrying 3 - 3 3   3. Worrying too much about different things 2 - 3 3   4. Trouble relaxing 3 - 3 3   5. Being so restless that it is hard to sit still 1 - 3 3   6. Becoming easily annoyed or irritable 3 - 3 2   7. Feeling afraid, as if something awful might happen 2 - 3 3   ADELINE-7 Total Score 17 - 21 20   If you checked any problems, how difficult have they made it for you to do your work, take care of things at home, or get along with other people? Extremely difficult - - -   Some encounter information is confidential and restricted. Go to Review Flowsheets activity to see all data.     GAD7 score is  is 20 indicating severe anxiety.     PROBLEM: SLEEP/INSOMNIA: impacted by work and her child waking every two hours.  Limited perceived supports.  FOB minimally involved and not validating    CURRENT STRESSORS: Occupational, Financial , Mental health symptoms, Relationship, Parenting and COVID 19 Pandemic and the social and physical distancing recommendations by the CDC and MD  COPING MECHANISMS AND SUPPORTS: Limited Perceived Supports  DIET:   "Adequate  EXERCISE: Adequate  SIDE EFFECTS: not applicable   SUBSTANCE USE:  Denies   COMPLIANCE: not applicable    REPORTS THE FOLLOWING NEW MEDICAL ISSUES:  none    PERTINENT PAST MEDICAL AND SURGICAL HISTORY     Past Medical History:   Diagnosis Date     Asthma      Asthma affecting pregnancy in first trimester 1/7/2020       VITALS     BP Readings from Last 1 Encounters:   10/07/20 112/64     Pulse Readings from Last 1 Encounters:   10/07/20 73     Wt Readings from Last 1 Encounters:   10/07/20 66.2 kg (146 lb)     Ht Readings from Last 1 Encounters:   12/18/19 1.6 m (5' 3\")     Estimated body mass index is 25.86 kg/m  as calculated from the following:    Height as of 12/18/19: 1.6 m (5' 3\").    Weight as of 10/7/20: 66.2 kg (146 lb).    LABS & IMAGING                                                                                                                  Recent Labs   Lab Test 08/12/20  0852 07/25/20  0611 07/25/20  0611   WBC  --   --  16.4*   HGB 12.1   < > 8.4*   HCT  --   --  25.1*   MCV  --   --  85   PLT  --   --  160   ANEU  --   --  14.6*    < > = values in this interval not displayed.     Recent Labs   Lab Test 07/18/20  1121 08/30/19  1752   NA  --  139   POTASSIUM  --  3.3*   CHLORIDE  --  108   CO2  --  22   GLC  --  90   DEVIN  --  8.6   BUN  --  10   CR 0.47* 0.57   GFRESTIMATED >90 >90   ALBUMIN  --  3.7   PROTTOTAL  --  7.5   AST 13 20   ALT 20 32   ALKPHOS  --  70   BILITOTAL  --  0.5        ALLERGY & IMMUNIZATIONS       Allergies   Allergen Reactions     Fig Extract [Ficus] Anaphylaxis     fig       MEDICAL REVIEW OF SYSTEMS:   Ten system review was completed with pertinent positives noted     MENTAL STATUS EXAM:   General/Constitutional:  Appearance:   awake, alert, adequately groomed, appeared stated age and no apparent distress  Attitude:    cooperative   Eye Contact:  good  Musculoskeletal:  Psychomotor Behavior:  no evidence of tardive dyskinesia, dystonia, or tics from the head " up  Psychiatric:  Speech:  clear, coherent, regular rate, rhythm, and volume,   No pressure speech noted.  Associations:  no loose associations  Thought Process:   logical, linear and goal oriented  Thought Content:    No evidence of suicidal ideation or homicidal ideation, no evidence of psychotic thought, no auditory hallucinations present and no visual hallucinations present  Mood:  anxious and depressed  Affect:  mood congruent and tearful at times  Insight:  fair  Judgment:  fair, adequate for safety  Impulse Control:  intact  Neurological:  Oriented to:  person, place, time, and situation  Attention Span and Concentration:  normal    Language: intact     Recent and Remote Memory:  Intact to interview. Not formally assessed. No amnesia.    Fund of Knowledge: appropriate        SAFETY   Feels safe in home: Yes  Suicidal ideation: No    History of suicide attempts:  No   Hx of impulsivity: Yes      SAFETY ASSESSMENT:   Risk factors: SI, maladaptive coping, trauma history, family dynamics, history of depression and social isolation  Protective factors: peer support, engaged in treatment, future oriented  and meaningfully engaged in safety planning     Patient Risk Assessment:  Today Carolyn reported no current SI. Recommended that patient call 911 or go to the local ED should there be a change in any of these risk factors.     DSM 5 DIAGNOSIS:   296.32 (F33.1) Major Depressive Disorder, Recurrent Episode, Moderate _ with postpartum onset     MEDICAL COMORBIDITY IMPACTING CLINICAL PICTURE: None noted    ASSESSMENT AND PLAN      Problem List Items Addressed This Visit        Behavioral     Not currently on medication.  Did not tolerate the sertraline and off for over three months.  Depression and anxiety are causing functional impairment in mulitple domains.  Will start escitalopram titrating to 10 mg and hydroxyzine as needed.  In addition, recommend therapy and patient agrees to referral within [facility], order  placed.  Follow-up 4 weeks         Moderate episode of recurrent major depressive disorder (H) - Primary    Relevant Medications    escitalopram (LEXAPRO) 10 MG tablet    hydrOXYzine (VISTARIL) 25 MG capsule    Other Relevant Orders    MENTAL HEALTH REFERRAL  - Adult; Outpatient Treatment; Individual/Couples/Family/Group Therapy/Health Psychology; Other: [facility] [phone]; We will contact you to schedule the appointment or please call with any questions           CONSULTS/REFERRALS:   Recommend therapy. Referral placed for [facility].  Call [facility] at [phone] if you do not hear from them soon  Coordinate care with therapist as needed    MEDICAL:   None at this time  Imaging/studies: none  Coordinate care with PCP (No Ref-Primary, Physician) as needed    FOLLOW UP: Schedule an appointment with me in one week and four weeks or sooner as needed. Call [phone] to schedule  Call the psychiatric nurse line with medication questions or concerns at [phone] or [phone]  Follow up with primary care provider as planned or for acute medical concerns.    PSYCHOEDUCATION:  Medication side effects and alternatives reviewed. Health promotion activities recommended and reviewed today. All questions addressed. Education and counseling completed regarding risks and benefits of medications and psychotherapy options.  Call the psychiatric nurse line with medication questions or concerns at [phone].  MyChart may be used to communicate with your provider, but this is not intended to be used for emergencies.  Discussed the Food and Drug Administration (FDA) requires that all antidepressants carry a warning that some children, adolescents and young adults may be at increased risk of suicide when taking antidepressants. Anyone taking an antidepressant should be watched closely for worsening depression or unusual behavior especially in the first few weeks after starting an SSRI. Keep in  mind, antidepressants are more likely to reduce suicide risk in the long run by improving mood.   Medlineplus.gov is information for patients.  It is run by the National Library of Medicine and it contains tons of information about all disorders and diseases and about all medications.      COMMUNITY RESOURCES:    CRISIS NUMBERS: Provided in AVS 2021  National Suicide Prevention Lifeline: 8-228-062-TALK (918-744-9874)  [employer]/resources for a list of additional resources (SOS)            [facility] - [phone]   [facility]-[phone] mobile unit/  crisis line  [facility] -[phone]   COPE  [place] Mobile Team -[phone] (adults)/ [phone] (child)  Poison Control Center - [phone]    OR  go to nearest ER  Crisis Text Line for any crisis  send this-   To: 809837   [facility]  [phone]  National Suicide Prevention Lifeline: 181.512.1959 (TTY: 341.198.9053). Call anytime for help.  (www.suicidepreventionlifeline.org)  [facility] (www.chirag.org): 262.859.5678 or 890-697-7646.   Mental Health Association (www.mentalhealth.org): 744.906.1573 or 543-198-4965.  [place] Crisis Text Line: Text [place] to 721373  Suicide LifeLine Chat: suicideWallStrip.org/chat    ADMINISTRATIVE BILLIN min spent interviewing patient, reviewing referral documents, obtaining and reviewing outside records, communication with other health specialists, and preparing this report on today's date    Video/Phone Start Time: 1045  Video/Phone End Time: 1113    Patient Status:  Patient will continue to be seen for ongoing consultation and stabilization.    Signed:   Erlinda Leblanc, MSN, APRN, FMHNP-[facility] ([facility])   Chart documentation done in part with Dragon Voice Recognition software.  Although reviewed after completion, some word and  grammatical errors may remain.

## 2021-05-20 PROBLEM — F33.1 MODERATE EPISODE OF RECURRENT MAJOR DEPRESSIVE DISORDER (H): Status: ACTIVE | Noted: 2021-05-20

## 2021-05-20 ASSESSMENT — ANXIETY QUESTIONNAIRES: GAD7 TOTAL SCORE: 20

## 2021-05-20 NOTE — ASSESSMENT & PLAN NOTE
Not currently on medication.  Did not tolerate the sertraline and off for over three months.  Depression and anxiety are causing functional impairment in mulitple domains.  Will start escitalopram titrating to 10 mg and hydroxyzine as needed.  In addition, recommend therapy and patient agrees to referral within [facility], order placed.  Follow-up 4 weeks

## 2021-06-16 ENCOUNTER — VIRTUAL VISIT (OUTPATIENT)
Dept: PSYCHIATRY | Facility: CLINIC | Age: 21
End: 2021-06-16
Payer: COMMERCIAL

## 2021-06-16 DIAGNOSIS — F60.3 BORDERLINE PERSONALITY DISORDER (H): Primary | ICD-10-CM

## 2021-06-16 DIAGNOSIS — Z91.49 HISTORY OF PSYCHOLOGICAL TRAUMA: ICD-10-CM

## 2021-06-16 PROCEDURE — 99214 OFFICE O/P EST MOD 30 MIN: CPT | Mod: 95 | Performed by: NURSE PRACTITIONER

## 2021-06-16 RX ORDER — LAMOTRIGINE 25 MG/1
25 TABLET ORAL DAILY
Qty: 60 TABLET | Refills: 1 | Status: SHIPPED | OUTPATIENT
Start: 2021-06-16

## 2021-06-16 ASSESSMENT — PATIENT HEALTH QUESTIONNAIRE - PHQ9
5. POOR APPETITE OR OVEREATING: NEARLY EVERY DAY
SUM OF ALL RESPONSES TO PHQ QUESTIONS 1-9: 25

## 2021-06-16 ASSESSMENT — ANXIETY QUESTIONNAIRES
3. WORRYING TOO MUCH ABOUT DIFFERENT THINGS: NEARLY EVERY DAY
2. NOT BEING ABLE TO STOP OR CONTROL WORRYING: NEARLY EVERY DAY
5. BEING SO RESTLESS THAT IT IS HARD TO SIT STILL: MORE THAN HALF THE DAYS
1. FEELING NERVOUS, ANXIOUS, OR ON EDGE: NEARLY EVERY DAY
6. BECOMING EASILY ANNOYED OR IRRITABLE: NEARLY EVERY DAY
7. FEELING AFRAID AS IF SOMETHING AWFUL MIGHT HAPPEN: NEARLY EVERY DAY
GAD7 TOTAL SCORE: 20
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
## 2021-06-17 ASSESSMENT — ANXIETY QUESTIONNAIRES: GAD7 TOTAL SCORE: 20

## 2021-06-20 PROBLEM — J45.909 ASTHMA COMPLICATING PREGNANCY, ANTEPARTUM: Status: RESOLVED | Noted: 2020-01-07 | Resolved: 2021-06-20

## 2021-06-20 PROBLEM — O99.519 ASTHMA COMPLICATING PREGNANCY, ANTEPARTUM: Status: RESOLVED | Noted: 2020-01-07 | Resolved: 2021-06-20

## 2021-06-20 PROBLEM — Z91.49 HISTORY OF PSYCHOLOGICAL TRAUMA: Status: ACTIVE | Noted: 2021-06-20

## 2021-06-20 PROBLEM — F60.3 BORDERLINE PERSONALITY DISORDER (H): Status: ACTIVE | Noted: 2021-06-20

## 2021-06-20 PROBLEM — R93.89 ABNORMAL ULTRASOUND: Status: RESOLVED | Noted: 2020-03-13 | Resolved: 2021-06-20

## 2021-06-20 NOTE — ASSESSMENT & PLAN NOTE
Patient is currently on escitalopram and experiencing intense mood lability, dissociative episodes and depression that is causing functional impairment in mulitple domains.  Meets criteria for borderline personality disorder and information sent to patient via Stylitics as follows:    Please follow this link to more information on BLPD for your information and we can discuss at next appt:  https://www.[facility].nih.gov/health/publications/borderline-personality-disorder/[email or web address]    In addition, will augment with lamotrigine titrated to 50 mg. She is open to DBT and a referral was placed. Follow-up in one month

## 2021-07-14 ENCOUNTER — DOCUMENTATION ONLY (OUTPATIENT)
Dept: PSYCHIATRY | Facility: CLINIC | Age: 21
End: 2021-07-14

## 2021-07-14 NOTE — PROGRESS NOTES
Patient no show for medication management appointment with [facility] assessment.      Erlinda Leblanc, MSN, APRN, CNP, [facility],   [facility]

## 2021-10-10 ENCOUNTER — HEALTH MAINTENANCE LETTER (OUTPATIENT)
Age: 21
End: 2021-10-10

## 2022-02-17 PROBLEM — O09.93 HIGH-RISK PREGNANCY IN THIRD TRIMESTER: Status: RESOLVED | Noted: 2019-11-27 | Resolved: 2021-06-20

## 2022-09-18 ENCOUNTER — HEALTH MAINTENANCE LETTER (OUTPATIENT)
Age: 22
End: 2022-09-18

## 2022-10-01 NOTE — ED PROVIDER NOTES
History     Chief Complaint:  Miscarriage    HPI  Carolyn Cerda is a  19 year old female who presents to the emergency department today for evaluation of a possible miscarriage.     The patient's last menstrual period was on 2019 though she states her periods have always been irregular, often going up to 6 months without a period. She began experiencing a lot of abdominal cramping, nausea, and vomiting which prompted her to take a home pregnancy test. All 3 of these home tests were positive. [facility] then went to [facility] who also confirmed her pregnancy with a urine pregnancy test. On , pelvic ultrasound estimated pregnancy to be 6 weeks, cardiac activity was present. This past Thursday, 6 days ago, she began having intermittent vaginal bleeding. She went to [facility] in [place] to have an abdominal ultrasound. Baby was seen on the ultrasound but no heart beat was heard. At this visit, she was again told she was just 6 weeks pregnant, which is contradictory to her previous appointment 5 weeks earlier. The provider told her the vaginal bleeding could have been due to intercourse, which she did have the day before. However, since Thursday she has had continued intermittent vaginal bleeding, sharp abdominal pains, and cramping. On Saturday she had heavier bleeding at 2am, 6am, and 8am with an onset of severe abdominal pains. She does report seeing small clots of possible tissue. Today, she has only had spotting. Prior to arrival today she was at clinic where she was told she miscarried. However, she presents here for a second opinion due to the confusion she has had with this pregnancy and how far along she had been. Here, patient is also having nausea, and sharp LLQ abdominal pains. No fevers. She has never used protection when having sex and had previously thought she could not get pregnant.     Allergies:  No known drug allergies    Medications:    The patient is not currently  Pt assessment completed and charted. VSS. Pt a/ox4. Pt taking small bites and sips. Pt still having discomfort and pain in LLQ. Pt had a sharp pain in lower back when voiding. Pt denies any other needs at this time. Pt calls out appropriately. Pt is a low fall risk;  -Bed in lowest position and wheels locked. -Call light within reach.   -Bedside table within reach.   -Non-skid footwear in place. taking any prescribed medications.    Past Medical History:    Anemia    Past Surgical History:    History reviewed. No pertinent surgical history.    Family History:    History reviewed. No pertinent family history.     Social History:  The patient reports that she has never smoked. She has never used smokeless tobacco. She reports that she has current or past drug history. She reports that she does not drink alcohol.   Marital Status: Single    Review of Systems   Constitutional: Negative for fever.   Gastrointestinal: Positive for abdominal pain and nausea. Negative for blood in stool, constipation, diarrhea and vomiting.   Genitourinary: Positive for pelvic pain and vaginal bleeding.   All other systems reviewed and are negative.      Physical Exam     Patient Vitals for the past 24 hrs:   BP Temp Temp src Pulse Heart Rate Resp SpO2 Weight   08/28/19 1256 129/65 98.6  F (37  C) Oral 75 75 16 100 % 61.2 kg (135 lb)     Physical Exam   Constitutional: She is oriented to person, place, and time. She appears well-developed and well-nourished. No distress.   HENT:   Head: Normocephalic and atraumatic.   Eyes: EOM are normal.   Neck: Normal range of motion.   Pulmonary/Chest: Effort normal. No respiratory distress.   Abdominal: Soft. She exhibits no distension and no mass. There is tenderness (lower). There is no rebound.   Genitourinary: There is no lesion on the right labia. There is no lesion on the left labia. Cervix exhibits no motion tenderness.   Genitourinary Comments: Dark blood noted in vagina, coming from cervical os, no tissue noted. No labial or vaginal lacerations. Cervix closed on exam.   Musculoskeletal: Normal range of motion.   Neurological: She is alert and oriented to person, place, and time.   Skin: Skin is warm and dry.   Psychiatric: She has a normal mood and affect.   Nursing note and vitals reviewed.      Emergency Department Course   Imaging:  Radiology findings were communicated with the  patient who voiced understanding of the findings.    US OB < 14 Weeks w Transvaginal   Preliminary Result   IMPRESSION:    1. Findings are suspicious for embryonic demise.   2. Small subchorionic hemorrhage.   3. Trace amount of free fluid in the pelvis is nonspecific, and could   be physiologic.         1. Findings are suspicious for embryonic demise.  2. Small subchorionic hemorrhage.  3. Trace amount of free fluid in the pelvis is nonspecific, and could  be physiologic.    Laboratory:  Laboratory findings were communicated with the patient who voiced understanding of the findings.  HCG quant: 3,810 (H)  ABO/Rh type and screen: O Pos; Neg antibody screen    Interventions:  1533: Tylenol 650mg tablet PO    Emergency Department Course:  Past medical records, nursing notes, and vitals reviewed.  1301: I performed an exam of the patient and obtained history, as documented above. GCS 15.    IV inserted and blood drawn.    1433: I discussed the case with the patient's clinic regarding the patient.    The patient was sent for an ultrasound while in the emergency department, findings above.    I rechecked the patient. Findings and plan explained to the Patient. Patient discharged home with instructions regarding supportive care, medications, and reasons to return. The importance of close follow-up was reviewed.     Impression & Plan    Medical Decision Making:  Carolyn Cerda is a 19 year old female with no significant pmhx who presents today with vaginal bleeding and abdominal cramping. She had a recent positive upt confirmed by US showing cardiac activity, LMP 5/20/2019. Comes today with concern for a miscarriage as she has been having vaginal bleeding with cramping and suspected passage of tissue; repeat US done at outside facility (unable to obtain images) showed no IUP per patient. On exam her vitals are stable, pelvic exam shows bleeding from cervical os, no tissue noted, os closed. hcg 3800, Pelvic US concerning for  demise given no growth and no cardiac activity. Likely incomplete miscarriage. No signs or symptoms of septic . Will discharge to home with plans to follow-up with ob-gyn.       Diagnosis:    ICD-10-CM    1. Bleeding R58 US OB < 14 Weeks w Transvaginal     US OB < 14 Weeks w Transvaginal   2. Pelvic cramping R10.2 US OB < 14 Weeks w Transvaginal     US OB < 14 Weeks w Transvaginal   3. Miscarriage O03.9        Disposition:   Discharged to home.       Scribe Disclosure:  I, Roxanne Saenz, am serving as a scribe at 1:01 PM on 2019 to document services personally performed by Gerardo [place] MD based on my observations and the provider's statements to me.    This patient was evaluated by and discussed with Staff, Dr. Felix Bhatia MD  [facility]  PGY2  [facility] EMERGENCY DEPARTMENT       Dominique Bhatia MD  Resident  [phone]

## 2023-01-29 ENCOUNTER — HEALTH MAINTENANCE LETTER (OUTPATIENT)
Age: 23
End: 2023-01-29

## 2024-02-25 ENCOUNTER — HEALTH MAINTENANCE LETTER (OUTPATIENT)
Age: 24
End: 2024-02-25

## (undated) DEVICE — BLADE CLIPPER 4406

## (undated) DEVICE — GLOVE PROTEXIS POWDER FREE 7.0 ORTHOPEDIC 2D73ET70

## (undated) DEVICE — PREP CHLORAPREP 26ML TINTED ORANGE  260815

## (undated) DEVICE — SU VICRYL 3-0 SH 27" J316H

## (undated) DEVICE — SOL NACL 0.9% IRRIG 1000ML BOTTLE 2F7124

## (undated) DEVICE — ESU GROUND PAD UNIVERSAL W/O CORD

## (undated) DEVICE — CATH TRAY FOLEY 16FR BARDEX W/DRAIN BAG STATLOCK 300316A

## (undated) DEVICE — LINEN C-SECTION 5415

## (undated) DEVICE — GLOVE PROTEXIS W/NEU-THERA 6.0  2D73TE60

## (undated) DEVICE — DRSG STERI STRIP 1/2X4" R1547

## (undated) DEVICE — SU PLAIN 2-0 CT 27" 853H

## (undated) DEVICE — SUCTION CANISTER MEDIVAC LINER 3000ML W/LID 65651-530

## (undated) DEVICE — SU PLAIN 0 FN-2 27" N864H

## (undated) DEVICE — SU VICRYL 0 CT-1 27" J340H

## (undated) DEVICE — SU CHROMIC 0 CT 27" 802H

## (undated) DEVICE — PACK C-SECTION LF PL15OTA83B

## (undated) RX ORDER — LIDOCAINE HCL/EPINEPHRINE/PF 2%-1:200K
VIAL (ML) INJECTION
Status: DISPENSED
Start: 2020-07-24

## (undated) RX ORDER — ONDANSETRON 2 MG/ML
INJECTION INTRAMUSCULAR; INTRAVENOUS
Status: DISPENSED
Start: 2020-07-24

## (undated) RX ORDER — FENTANYL CITRATE 50 UG/ML
INJECTION, SOLUTION INTRAMUSCULAR; INTRAVENOUS
Status: DISPENSED
Start: 2020-07-24

## (undated) RX ORDER — OXYTOCIN/0.9 % SODIUM CHLORIDE 30/500 ML
PLASTIC BAG, INJECTION (ML) INTRAVENOUS
Status: DISPENSED
Start: 2020-07-24

## (undated) RX ORDER — MORPHINE SULFATE 1 MG/ML
INJECTION, SOLUTION EPIDURAL; INTRATHECAL; INTRAVENOUS
Status: DISPENSED
Start: 2020-07-24